# Patient Record
Sex: FEMALE | Race: WHITE | NOT HISPANIC OR LATINO | Employment: OTHER | URBAN - METROPOLITAN AREA
[De-identification: names, ages, dates, MRNs, and addresses within clinical notes are randomized per-mention and may not be internally consistent; named-entity substitution may affect disease eponyms.]

---

## 2018-01-29 ENCOUNTER — RECORDS - HEALTHEAST (OUTPATIENT)
Dept: LAB | Facility: CLINIC | Age: 83
End: 2018-01-29

## 2018-01-29 LAB
ANION GAP SERPL CALCULATED.3IONS-SCNC: 13 MMOL/L (ref 5–18)
BUN SERPL-MCNC: 27 MG/DL (ref 8–28)
CALCIUM SERPL-MCNC: 9.6 MG/DL (ref 8.5–10.5)
CHLORIDE BLD-SCNC: 105 MMOL/L (ref 98–107)
CHOLEST SERPL-MCNC: 250 MG/DL
CO2 SERPL-SCNC: 22 MMOL/L (ref 22–31)
CREAT SERPL-MCNC: 1.08 MG/DL (ref 0.6–1.1)
FASTING STATUS PATIENT QL REPORTED: ABNORMAL
GFR SERPL CREATININE-BSD FRML MDRD: 48 ML/MIN/1.73M2
GLUCOSE BLD-MCNC: 127 MG/DL (ref 70–125)
HDLC SERPL-MCNC: 59 MG/DL
LDLC SERPL CALC-MCNC: 169 MG/DL
POTASSIUM BLD-SCNC: 5.2 MMOL/L (ref 3.5–5)
SODIUM SERPL-SCNC: 140 MMOL/L (ref 136–145)
TRIGL SERPL-MCNC: 112 MG/DL
URATE SERPL-MCNC: 3.9 MG/DL (ref 2–7.5)

## 2019-01-23 ENCOUNTER — RECORDS - HEALTHEAST (OUTPATIENT)
Dept: LAB | Facility: CLINIC | Age: 84
End: 2019-01-23

## 2019-01-23 LAB
ANION GAP SERPL CALCULATED.3IONS-SCNC: 9 MMOL/L (ref 5–18)
BUN SERPL-MCNC: 28 MG/DL (ref 8–28)
CALCIUM SERPL-MCNC: 9.7 MG/DL (ref 8.5–10.5)
CHLORIDE BLD-SCNC: 105 MMOL/L (ref 98–107)
CHOLEST SERPL-MCNC: 243 MG/DL
CO2 SERPL-SCNC: 26 MMOL/L (ref 22–31)
CREAT SERPL-MCNC: 1.05 MG/DL (ref 0.6–1.1)
FASTING STATUS PATIENT QL REPORTED: ABNORMAL
GFR SERPL CREATININE-BSD FRML MDRD: 50 ML/MIN/1.73M2
GLUCOSE BLD-MCNC: 113 MG/DL (ref 70–125)
HDLC SERPL-MCNC: 55 MG/DL
LDLC SERPL CALC-MCNC: 164 MG/DL
POTASSIUM BLD-SCNC: 4.8 MMOL/L (ref 3.5–5)
SODIUM SERPL-SCNC: 140 MMOL/L (ref 136–145)
TRIGL SERPL-MCNC: 119 MG/DL
URATE SERPL-MCNC: 3.8 MG/DL (ref 2–7.5)

## 2020-08-11 ENCOUNTER — RECORDS - HEALTHEAST (OUTPATIENT)
Dept: LAB | Facility: CLINIC | Age: 85
End: 2020-08-11

## 2020-08-11 LAB
ANION GAP SERPL CALCULATED.3IONS-SCNC: 10 MMOL/L (ref 5–18)
BUN SERPL-MCNC: 26 MG/DL (ref 8–28)
CALCIUM SERPL-MCNC: 9.6 MG/DL (ref 8.5–10.5)
CHLORIDE BLD-SCNC: 106 MMOL/L (ref 98–107)
CHOLEST SERPL-MCNC: 255 MG/DL
CO2 SERPL-SCNC: 24 MMOL/L (ref 22–31)
CREAT SERPL-MCNC: 1.07 MG/DL (ref 0.6–1.1)
FASTING STATUS PATIENT QL REPORTED: ABNORMAL
GFR SERPL CREATININE-BSD FRML MDRD: 48 ML/MIN/1.73M2
GLUCOSE BLD-MCNC: 108 MG/DL (ref 70–125)
HDLC SERPL-MCNC: 52 MG/DL
LDLC SERPL CALC-MCNC: 180 MG/DL
POTASSIUM BLD-SCNC: 4.8 MMOL/L (ref 3.5–5)
SODIUM SERPL-SCNC: 140 MMOL/L (ref 136–145)
TRIGL SERPL-MCNC: 115 MG/DL

## 2021-02-01 ENCOUNTER — RECORDS - HEALTHEAST (OUTPATIENT)
Dept: LAB | Facility: CLINIC | Age: 86
End: 2021-02-01

## 2021-02-01 LAB
ANION GAP SERPL CALCULATED.3IONS-SCNC: 12 MMOL/L (ref 5–18)
BUN SERPL-MCNC: 34 MG/DL (ref 8–28)
CALCIUM SERPL-MCNC: 9.7 MG/DL (ref 8.5–10.5)
CHLORIDE BLD-SCNC: 105 MMOL/L (ref 98–107)
CO2 SERPL-SCNC: 22 MMOL/L (ref 22–31)
CREAT SERPL-MCNC: 1.23 MG/DL (ref 0.6–1.1)
GFR SERPL CREATININE-BSD FRML MDRD: 41 ML/MIN/1.73M2
GLUCOSE BLD-MCNC: 118 MG/DL (ref 70–125)
POTASSIUM BLD-SCNC: 4.5 MMOL/L (ref 3.5–5)
SODIUM SERPL-SCNC: 139 MMOL/L (ref 136–145)

## 2021-02-24 ENCOUNTER — COMMUNICATION - HEALTHEAST (OUTPATIENT)
Dept: SCHEDULING | Facility: CLINIC | Age: 86
End: 2021-02-24

## 2021-02-25 ENCOUNTER — COMMUNICATION - HEALTHEAST (OUTPATIENT)
Dept: CARDIOLOGY | Facility: CLINIC | Age: 86
End: 2021-02-25

## 2021-03-02 ENCOUNTER — RECORDS - HEALTHEAST (OUTPATIENT)
Dept: LAB | Facility: CLINIC | Age: 86
End: 2021-03-02

## 2021-03-02 LAB
ANION GAP SERPL CALCULATED.3IONS-SCNC: 13 MMOL/L (ref 5–18)
BUN SERPL-MCNC: 71 MG/DL (ref 8–28)
CALCIUM SERPL-MCNC: 9 MG/DL (ref 8.5–10.5)
CHLORIDE BLD-SCNC: 102 MMOL/L (ref 98–107)
CO2 SERPL-SCNC: 24 MMOL/L (ref 22–31)
CREAT SERPL-MCNC: 1.72 MG/DL (ref 0.6–1.1)
GFR SERPL CREATININE-BSD FRML MDRD: 28 ML/MIN/1.73M2
GLUCOSE BLD-MCNC: 103 MG/DL (ref 70–125)
POTASSIUM BLD-SCNC: 4.4 MMOL/L (ref 3.5–5)
SODIUM SERPL-SCNC: 139 MMOL/L (ref 136–145)

## 2021-03-03 ENCOUNTER — RECORDS - HEALTHEAST (OUTPATIENT)
Dept: ADMINISTRATIVE | Facility: OTHER | Age: 86
End: 2021-03-03

## 2021-03-03 ENCOUNTER — AMBULATORY - HEALTHEAST (OUTPATIENT)
Dept: CARDIOLOGY | Facility: CLINIC | Age: 86
End: 2021-03-03

## 2021-03-04 ENCOUNTER — OFFICE VISIT - HEALTHEAST (OUTPATIENT)
Dept: CARDIOLOGY | Facility: CLINIC | Age: 86
End: 2021-03-04

## 2021-03-04 ENCOUNTER — AMBULATORY - HEALTHEAST (OUTPATIENT)
Dept: CARDIOLOGY | Facility: CLINIC | Age: 86
End: 2021-03-04

## 2021-03-04 DIAGNOSIS — I35.0 SEVERE AORTIC STENOSIS: ICD-10-CM

## 2021-03-04 DIAGNOSIS — I35.0 NONRHEUMATIC AORTIC VALVE STENOSIS: ICD-10-CM

## 2021-03-04 DIAGNOSIS — I48.91 ATRIAL FIBRILLATION, NEW ONSET (H): ICD-10-CM

## 2021-03-12 ENCOUNTER — AMBULATORY - HEALTHEAST (OUTPATIENT)
Dept: CARDIOLOGY | Facility: HOSPITAL | Age: 86
End: 2021-03-12

## 2021-03-12 ENCOUNTER — AMBULATORY - HEALTHEAST (OUTPATIENT)
Dept: CARDIOLOGY | Facility: CLINIC | Age: 86
End: 2021-03-12

## 2021-03-12 DIAGNOSIS — Z11.59 ENCOUNTER FOR SCREENING FOR OTHER VIRAL DISEASES: ICD-10-CM

## 2021-03-12 DIAGNOSIS — I48.91 ATRIAL FIBRILLATION, NEW ONSET (H): ICD-10-CM

## 2021-03-12 LAB
ANION GAP SERPL CALCULATED.3IONS-SCNC: 10 MMOL/L (ref 5–18)
BUN SERPL-MCNC: 44 MG/DL (ref 8–28)
CALCIUM SERPL-MCNC: 9.4 MG/DL (ref 8.5–10.5)
CHLORIDE BLD-SCNC: 106 MMOL/L (ref 98–107)
CO2 SERPL-SCNC: 25 MMOL/L (ref 22–31)
CREAT SERPL-MCNC: 1.27 MG/DL (ref 0.6–1.1)
GFR SERPL CREATININE-BSD FRML MDRD: 40 ML/MIN/1.73M2
GLUCOSE BLD-MCNC: 132 MG/DL (ref 70–125)
POTASSIUM BLD-SCNC: 4.6 MMOL/L (ref 3.5–5)
SODIUM SERPL-SCNC: 141 MMOL/L (ref 136–145)

## 2021-03-14 ENCOUNTER — COMMUNICATION - HEALTHEAST (OUTPATIENT)
Dept: SCHEDULING | Facility: CLINIC | Age: 86
End: 2021-03-14

## 2021-03-15 ENCOUNTER — COMMUNICATION - HEALTHEAST (OUTPATIENT)
Dept: SCHEDULING | Facility: CLINIC | Age: 86
End: 2021-03-15

## 2021-03-15 ENCOUNTER — COMMUNICATION - HEALTHEAST (OUTPATIENT)
Dept: CARDIOLOGY | Facility: HOSPITAL | Age: 86
End: 2021-03-15

## 2021-03-16 ENCOUNTER — SURGERY - HEALTHEAST (OUTPATIENT)
Dept: CARDIOLOGY | Facility: CLINIC | Age: 86
End: 2021-03-16

## 2021-03-16 DIAGNOSIS — I35.0 SEVERE AORTIC VALVE STENOSIS: ICD-10-CM

## 2021-03-18 ENCOUNTER — COMMUNICATION - HEALTHEAST (OUTPATIENT)
Dept: CARDIOLOGY | Facility: CLINIC | Age: 86
End: 2021-03-18

## 2021-03-19 ENCOUNTER — HOSPITAL ENCOUNTER (OUTPATIENT)
Dept: CT IMAGING | Facility: CLINIC | Age: 86
Discharge: HOME | End: 2021-03-19
Attending: INTERNAL MEDICINE

## 2021-03-19 ENCOUNTER — INFUSION - HEALTHEAST (OUTPATIENT)
Dept: INFUSION THERAPY | Facility: CLINIC | Age: 86
End: 2021-03-19

## 2021-03-19 DIAGNOSIS — I35.0 SEVERE AORTIC STENOSIS: ICD-10-CM

## 2021-03-19 DIAGNOSIS — I35.0 SEVERE AORTIC VALVE STENOSIS: ICD-10-CM

## 2021-03-19 DIAGNOSIS — Z11.59 ENCOUNTER FOR SCREENING FOR OTHER VIRAL DISEASES: ICD-10-CM

## 2021-03-21 ENCOUNTER — COMMUNICATION - HEALTHEAST (OUTPATIENT)
Dept: CARDIOLOGY | Facility: HOSPITAL | Age: 86
End: 2021-03-21

## 2021-03-23 ENCOUNTER — SURGERY - HEALTHEAST (OUTPATIENT)
Dept: CARDIOLOGY | Facility: CLINIC | Age: 86
End: 2021-03-23

## 2021-03-23 ENCOUNTER — COMMUNICATION - HEALTHEAST (OUTPATIENT)
Dept: CARDIOLOGY | Facility: CLINIC | Age: 86
End: 2021-03-23

## 2021-03-23 DIAGNOSIS — I35.0 SEVERE AORTIC VALVE STENOSIS: ICD-10-CM

## 2021-03-25 ENCOUNTER — COMMUNICATION - HEALTHEAST (OUTPATIENT)
Dept: CARDIOLOGY | Facility: HOSPITAL | Age: 86
End: 2021-03-25

## 2021-03-27 LAB
SARS-COV-2 PCR COMMENT: NORMAL
SARS-COV-2 RNA SPEC QL NAA+PROBE: NEGATIVE
SARS-COV-2 VIRUS SPECIMEN SOURCE: NORMAL

## 2021-03-28 ENCOUNTER — COMMUNICATION - HEALTHEAST (OUTPATIENT)
Dept: SCHEDULING | Facility: CLINIC | Age: 86
End: 2021-03-28

## 2021-03-29 ENCOUNTER — SURGERY - HEALTHEAST (OUTPATIENT)
Dept: CARDIOLOGY | Facility: HOSPITAL | Age: 86
End: 2021-03-29

## 2021-03-29 ASSESSMENT — MIFFLIN-ST. JEOR: SCORE: 1285.44

## 2021-04-02 ENCOUNTER — OFFICE VISIT - HEALTHEAST (OUTPATIENT)
Dept: CARDIOLOGY | Facility: CLINIC | Age: 86
End: 2021-04-02

## 2021-04-02 DIAGNOSIS — I35.0 SEVERE AORTIC VALVE STENOSIS: ICD-10-CM

## 2021-04-02 DIAGNOSIS — I50.32 CHRONIC HEART FAILURE WITH PRESERVED EJECTION FRACTION (H): ICD-10-CM

## 2021-04-02 DIAGNOSIS — I10 ESSENTIAL HYPERTENSION: ICD-10-CM

## 2021-04-02 DIAGNOSIS — I48.19 PERSISTENT ATRIAL FIBRILLATION (H): ICD-10-CM

## 2021-04-05 ENCOUNTER — COMMUNICATION - HEALTHEAST (OUTPATIENT)
Dept: CARDIOLOGY | Facility: CLINIC | Age: 86
End: 2021-04-05

## 2021-04-05 ENCOUNTER — OFFICE VISIT - HEALTHEAST (OUTPATIENT)
Dept: CARDIOLOGY | Facility: CLINIC | Age: 86
End: 2021-04-05

## 2021-04-05 DIAGNOSIS — I35.0 NONRHEUMATIC AORTIC VALVE STENOSIS: ICD-10-CM

## 2021-04-07 ENCOUNTER — COMMUNICATION - HEALTHEAST (OUTPATIENT)
Dept: CARDIOLOGY | Facility: CLINIC | Age: 86
End: 2021-04-07

## 2021-04-09 ENCOUNTER — COMMUNICATION - HEALTHEAST (OUTPATIENT)
Dept: CARDIOLOGY | Facility: CLINIC | Age: 86
End: 2021-04-09

## 2021-04-09 DIAGNOSIS — I50.30 (HFPEF) HEART FAILURE WITH PRESERVED EJECTION FRACTION (H): ICD-10-CM

## 2021-04-09 DIAGNOSIS — I35.0 SEVERE AORTIC STENOSIS: ICD-10-CM

## 2021-04-12 ENCOUNTER — COMMUNICATION - HEALTHEAST (OUTPATIENT)
Dept: CARDIOLOGY | Facility: CLINIC | Age: 86
End: 2021-04-12

## 2021-04-14 DIAGNOSIS — I35.0 AORTIC STENOSIS: Primary | ICD-10-CM

## 2021-04-14 DIAGNOSIS — Z11.59 ENCOUNTER FOR SCREENING FOR OTHER VIRAL DISEASES: ICD-10-CM

## 2021-04-16 ENCOUNTER — AMBULATORY - HEALTHEAST (OUTPATIENT)
Dept: FAMILY MEDICINE | Facility: CLINIC | Age: 86
End: 2021-04-16

## 2021-04-16 ENCOUNTER — COMMUNICATION - HEALTHEAST (OUTPATIENT)
Dept: CARDIOLOGY | Facility: CLINIC | Age: 86
End: 2021-04-16

## 2021-04-16 DIAGNOSIS — Z11.59 ENCOUNTER FOR SCREENING FOR OTHER VIRAL DISEASES: ICD-10-CM

## 2021-04-17 ENCOUNTER — AMBULATORY - HEALTHEAST (OUTPATIENT)
Dept: FAMILY MEDICINE | Facility: CLINIC | Age: 86
End: 2021-04-17

## 2021-04-17 DIAGNOSIS — Z11.59 ENCOUNTER FOR SCREENING FOR OTHER VIRAL DISEASES: ICD-10-CM

## 2021-04-19 ENCOUNTER — AMBULATORY - HEALTHEAST (OUTPATIENT)
Dept: CARDIOLOGY | Facility: CLINIC | Age: 86
End: 2021-04-19

## 2021-04-19 ENCOUNTER — ANESTHESIA EVENT (OUTPATIENT)
Dept: CARDIOLOGY | Facility: CLINIC | Age: 86
DRG: 267 | End: 2021-04-19
Payer: MEDICARE

## 2021-04-19 ENCOUNTER — COMMUNICATION - HEALTHEAST (OUTPATIENT)
Dept: CARDIOLOGY | Facility: CLINIC | Age: 86
End: 2021-04-19

## 2021-04-19 ENCOUNTER — COMMUNICATION - HEALTHEAST (OUTPATIENT)
Dept: SCHEDULING | Facility: CLINIC | Age: 86
End: 2021-04-19

## 2021-04-19 ENCOUNTER — OFFICE VISIT - HEALTHEAST (OUTPATIENT)
Dept: CARDIOLOGY | Facility: CLINIC | Age: 86
End: 2021-04-19

## 2021-04-19 DIAGNOSIS — I35.0 SEVERE AORTIC VALVE STENOSIS: ICD-10-CM

## 2021-04-19 DIAGNOSIS — I50.30 (HFPEF) HEART FAILURE WITH PRESERVED EJECTION FRACTION (H): ICD-10-CM

## 2021-04-19 DIAGNOSIS — I35.0 SEVERE AORTIC STENOSIS: ICD-10-CM

## 2021-04-19 DIAGNOSIS — I10 ESSENTIAL HYPERTENSION: ICD-10-CM

## 2021-04-19 DIAGNOSIS — I35.0 AORTIC STENOSIS: ICD-10-CM

## 2021-04-19 DIAGNOSIS — I50.32 CHRONIC DIASTOLIC CONGESTIVE HEART FAILURE (H): ICD-10-CM

## 2021-04-19 DIAGNOSIS — I48.19 PERSISTENT ATRIAL FIBRILLATION (H): ICD-10-CM

## 2021-04-19 DIAGNOSIS — I35.0 NONRHEUMATIC AORTIC VALVE STENOSIS: Primary | ICD-10-CM

## 2021-04-19 DIAGNOSIS — I48.91 ATRIAL FIBRILLATION, NEW ONSET (H): ICD-10-CM

## 2021-04-19 DIAGNOSIS — I50.32 CHRONIC HEART FAILURE WITH PRESERVED EJECTION FRACTION (H): ICD-10-CM

## 2021-04-19 LAB
ALBUMIN SERPL-MCNC: 3.9 G/DL (ref 3.5–5)
ALP SERPL-CCNC: 67 U/L (ref 45–120)
ALT SERPL W P-5'-P-CCNC: 18 U/L (ref 0–45)
ANION GAP SERPL CALCULATED.3IONS-SCNC: 8 MMOL/L (ref 5–18)
AST SERPL W P-5'-P-CCNC: 18 U/L (ref 0–40)
ATRIAL RATE - MUSE: NORMAL
BASOPHILS # BLD AUTO: 0.1 THOU/UL (ref 0–0.2)
BASOPHILS NFR BLD AUTO: 1 % (ref 0–2)
BILIRUB SERPL-MCNC: 0.9 MG/DL (ref 0–1)
BNP SERPL-MCNC: 686 PG/ML (ref 0–167)
BUN SERPL-MCNC: 48 MG/DL (ref 8–28)
CALCIUM SERPL-MCNC: 9.7 MG/DL (ref 8.5–10.5)
CHLORIDE BLD-SCNC: 99 MMOL/L (ref 98–107)
CO2 SERPL-SCNC: 32 MMOL/L (ref 22–31)
CREAT SERPL-MCNC: 1.48 MG/DL (ref 0.6–1.1)
DIASTOLIC BLOOD PRESSURE - MUSE: NORMAL
EOSINOPHIL # BLD AUTO: 0.2 THOU/UL (ref 0–0.4)
EOSINOPHIL NFR BLD AUTO: 3 % (ref 0–6)
ERYTHROCYTE [DISTWIDTH] IN BLOOD BY AUTOMATED COUNT: 14.8 % (ref 11–14.5)
GFR SERPL CREATININE-BSD FRML MDRD: 33 ML/MIN/1.73M2
GLUCOSE BLD-MCNC: 139 MG/DL (ref 70–125)
HCT VFR BLD AUTO: 36.9 % (ref 35–47)
HGB BLD-MCNC: 11.2 G/DL (ref 12–16)
IMM GRANULOCYTES # BLD: 0.1 THOU/UL
IMM GRANULOCYTES NFR BLD: 1 %
INR PPP: 1.06 (ref 0.9–1.1)
INTERPRETATION ECG - MUSE: NORMAL
LYMPHOCYTES # BLD AUTO: 1.1 THOU/UL (ref 0.8–4.4)
LYMPHOCYTES NFR BLD AUTO: 13 % (ref 20–40)
MAGNESIUM SERPL-MCNC: 2.1 MG/DL (ref 1.8–2.6)
MCH RBC QN AUTO: 28.9 PG (ref 27–34)
MCHC RBC AUTO-ENTMCNC: 30.4 G/DL (ref 32–36)
MCV RBC AUTO: 95 FL (ref 80–100)
MONOCYTES # BLD AUTO: 0.5 THOU/UL (ref 0–0.9)
MONOCYTES NFR BLD AUTO: 6 % (ref 2–10)
NEUTROPHILS # BLD AUTO: 6.8 THOU/UL (ref 2–7.7)
NEUTROPHILS NFR BLD AUTO: 77 % (ref 50–70)
P AXIS - MUSE: NORMAL
PLATELET # BLD AUTO: 160 THOU/UL (ref 140–440)
PMV BLD AUTO: 11.9 FL (ref 8.5–12.5)
POTASSIUM BLD-SCNC: 4.4 MMOL/L (ref 3.5–5)
PR INTERVAL - MUSE: NORMAL
PROT SERPL-MCNC: 7.3 G/DL (ref 6–8)
QRS DURATION - MUSE: 88 MS
QT - MUSE: 442 MS
QTC - MUSE: 473 MS
R AXIS - MUSE: -12 DEGREES
RBC # BLD AUTO: 3.88 MILL/UL (ref 3.8–5.4)
SODIUM SERPL-SCNC: 139 MMOL/L (ref 136–145)
SPECIMEN STATUS - HISTORICAL: NORMAL
SYSTOLIC BLOOD PRESSURE - MUSE: NORMAL
T AXIS - MUSE: 128 DEGREES
VENTRICULAR RATE- MUSE: 69 BPM
WBC: 8.8 THOU/UL (ref 4–11)

## 2021-04-19 PROCEDURE — 86900 BLOOD TYPING SEROLOGIC ABO: CPT | Performed by: INTERNAL MEDICINE

## 2021-04-19 PROCEDURE — 86901 BLOOD TYPING SEROLOGIC RH(D): CPT | Performed by: INTERNAL MEDICINE

## 2021-04-19 PROCEDURE — 86923 COMPATIBILITY TEST ELECTRIC: CPT | Performed by: INTERNAL MEDICINE

## 2021-04-19 PROCEDURE — 86850 RBC ANTIBODY SCREEN: CPT | Performed by: INTERNAL MEDICINE

## 2021-04-19 RX ORDER — ASPIRIN 325 MG
325 TABLET ORAL DAILY
Status: CANCELLED | OUTPATIENT
Start: 2021-04-19

## 2021-04-19 RX ORDER — CEFAZOLIN SODIUM 2 G/50ML
2 SOLUTION INTRAVENOUS
Status: CANCELLED | OUTPATIENT
Start: 2021-04-19

## 2021-04-19 RX ORDER — LIDOCAINE 40 MG/G
CREAM TOPICAL
Status: CANCELLED | OUTPATIENT
Start: 2021-04-19

## 2021-04-19 RX ORDER — SODIUM CHLORIDE 9 MG/ML
INJECTION, SOLUTION INTRAVENOUS CONTINUOUS
Status: CANCELLED | OUTPATIENT
Start: 2021-04-19

## 2021-04-19 ASSESSMENT — MIFFLIN-ST. JEOR: SCORE: 1219.21

## 2021-04-19 NOTE — ANESTHESIA PREPROCEDURE EVALUATION
Anesthesia Pre-Procedure Evaluation    Patient: Radha Orona   MRN: [id number] : 9/10/1931        Preoperative Diagnosis: valvular disease   Procedure : Procedure(s):  CV TRANSCATHETER AORTIC VALVE REPLACEMENT  OR TRANSCATHETER AORTIC VALVE REPLACEMENT, OPEN FEMORAL ARTERY APPROACH     No past medical history on file.   No past surgical history on file.   Not on File   Social History     Tobacco Use     Smoking status: Not on file   Substance Use Topics     Alcohol use: Not on file      Wt Readings from Last 1 Encounters:   No data found for Wt        Anesthesia Evaluation   Pt has had prior anesthetic.     No history of anesthetic complications       ROS/MED HX  ENT/Pulmonary:  - neg pulmonary ROS     Neurologic:  - neg neurologic ROS     Cardiovascular:     (+) -----Taking blood thinners Pt has received instructions: dysrhythmias, a-fib, Previous cardiac testing   Echo: Date: 2021 Results:  1. Normal left ventricular size and systolic performance with a visually   estimated ejection fraction of 55%.   2. There is severe aortic stenosis.     The mean gradient is measured at 38-41 mmHg with peak velocity of 4.3   m/s.  Calculated valve area 0.6 cm . VRVTI = 0.2. VRvel = 0.2.   3. There is trace aortic insufficiency.   4. There is mild-moderate, to perhaps moderate, mitral insufficiency.  5. Normal right ventricular size and systolic performance.   6. There is moderate left atrial enlargement. There is mild right atrial   enlargement.   Stress Test: Date: Results:    ECG Reviewed: Date: Results:    Cath: Date: 3/29/21 Results:  Radha Orona is a 89 y.o. old female with severe aortic stenosis, moderate   MR, HTN, newer onset A fib (on Eliquis and Metoprolol), HL, CKD, gout who   is here for pre-TAVR angiography.      - non-obstructive CAD  - proceed w/ TAVR w/u as planned  - re-start apixaban  - continue aggressive risk factor modification    METS/Exercise Tolerance:     Hematologic:       Musculoskeletal:        GI/Hepatic:  - neg GI/hepatic ROS     Renal/Genitourinary:       Endo:       Psychiatric/Substance Use:       Infectious Disease:       Malignancy:       Other:            Physical Exam    Airway        Mallampati: II   TM distance: > 3 FB   Neck ROM: full   Mouth opening: > 3 cm    Respiratory Devices and Support         Dental  no notable dental history         Cardiovascular   cardiovascular exam normal       Rhythm and rate: irregular and normal     Pulmonary   pulmonary exam normal                OUTSIDE LABS:  CBC: No results found for: WBC, HGB, HCT, PLT  BMP: No results found for: NA, POTASSIUM, CHLORIDE, CO2, BUN, CR, GLC  COAGS: No results found for: PTT, INR, FIBR  POC: No results found for: BGM, HCG, HCGS  HEPATIC: No results found for: ALBUMIN, PROTTOTAL, ALT, AST, GGT, ALKPHOS, BILITOTAL, BILIDIRECT, PHANI  OTHER: No results found for: PH, LACT, A1C, SAM, PHOS, MAG, LIPASE, AMYLASE, TSH, T4, T3, CRP, SED    Anesthesia Plan    ASA Status:  4      Anesthesia Type: MAC.     - Reason for MAC: immobility needed         Techniques and Equipment:     - Lines/Monitors: 2nd IV     Consents    Anesthesia Plan(s) and associated risks, benefits, and realistic alternatives discussed. Questions answered and patient/representative(s) expressed understanding.     - Discussed with:  Patient      - Extended Intubation/Ventilatory Support Discussed: No.      - Patient is DNR/DNI Status: No    Use of blood products discussed: Yes.     - Discussed with: Patient.     - Consented: consented to blood products     Postoperative Care    Pain management: IV analgesics.   PONV prophylaxis: Ondansetron (or other 5HT-3)     Comments:                Adam Stevens MD

## 2021-04-19 NOTE — H&P
[facility]  Radha Orona MRN: [id number]  9/10/1931  Date of Admission:(Not on file)  Primary care provider: Chevy Funk         Chief Complaint:   Dyspnea          History of Present Illness:   Radha Orona is an 89 year old female with a past medical history significant for HTN, paroxysmal atrial fibrillation, HLD, CKD 3, gout, moderate MR, and severe symptomatic aortic stenosis. Patient was recently seen in clinic at [facility] for progressive functional decline 2/2 dyspnea with exertion that she first noted ~1 year ago. Per notation, she was recently hospitalized in February for new afib as well as a heart failure exaceration. TTE demonstrated normal function as well as severe AS. Patient ultimately referred for TAVR with the [facility] group.     Patient visited in the PACU. She is awake, alert, and feels well. She is pleased by how quickly the procedure was completed. Denies dyspnea, chest pain, headache, vision change, fever, chills, numbness, weakness, or abdominal pain. Denies any extremity pain.            Review of Systems:    10 point review of systems negative except for stated above in HPI.          Past Medical History:   Medical History reviewed, discussed above.  No past medical history on file.          Past Surgical History:   Surgical History reviewed.   History reviewed. No pertinent surgical history.          Social History:   Social History reviewed.  Social History     Tobacco Use     Smoking status: Not on file   Substance Use Topics     Alcohol use: Not on file             Family History:   Family History reviewed.   History reviewed. No pertinent family history.          Allergies:     Allergies   Allergen Reactions     Celebrex [Celecoxib] Unknown     Other reaction(s): *Unknown  hives       Other Environmental Allergy      Sulfa Drugs      Other reaction(s): *Unknown  rash               Medications:   Medications Reviewed.   Current  "Facility-Administered Medications   Medication     aspirin (ASA) tablet 325 mg     fentaNYL (PF) (SUBLIMAZE) injection 25-50 mcg     HOLD: apixaban (ELIQUIS) 48 hours pre-procedure     HYDROmorphone (PF) (DILAUDID) injection 0.3-0.5 mg     lactated ringers infusion     lactated ringers infusion     lidocaine (LMX4) cream     lidocaine 1 % 0.1-1 mL     lidocaine 1 %     ondansetron (ZOFRAN-ODT) ODT tab 4 mg    Or     ondansetron (ZOFRAN) injection 4 mg     Patient is NOT allergic to contrast dye OR was given pre-procedure oral steroids for treatment     prochlorperazine (COMPAZINE) injection 5 mg     sodium chloride (PF) 0.9% PF flush 3 mL     sodium chloride (PF) 0.9% PF flush 3 mL     sodium chloride (PF) 0.9% PF flush 3 mL     sodium chloride 0.9% infusion     sodium chloride 0.9% infusion             Physical Exam:   Vitals were reviewed.  Blood pressure 116/80, pulse 75, temperature 97.5  F (36.4  C), temperature source Oral, resp. rate 20, height 1.651 m (5' 5\"), weight 86 kg (189 lb 9.5 oz), SpO2 98 %.    General: AAOx3, NAD  Skin: Not jaundiced, no rash, no ecchymoses  HEENT: MMM, PERRLA, EOM intact  CV: RRR, normal S1S2, no murmur, clicks, rubs  Resp: Limited due to bed rest, but seemingly clear to auscultation bilaterally, no wheezes, rhonchi  Abd: Soft, non-tender, BS+, no masses appreciated  Extremities: warm and well perfused. Pedal and post tib pulses are not palpable, but can be detected via doppler  Neuro: No lateralizing symptoms or focal neurologic deficits        Labs:   Routine Labs:  No results found for: TROPI, TROPONIN, TROPR, TROPN  CMP  Recent Labs   Lab 04/20/21  1254 04/20/21  0841     --    POTASSIUM 3.5 3.7   CHLORIDE 101  --    CO2 28  --    ANIONGAP 8  --    *  --    BUN 47*  --    CR 1.38* 1.44*   GFRESTIMATED 34* 32*   GFRESTBLACK 39* 37*   SAM 9.1  --    MAG 2.3  --    PROTTOTAL 6.6*  --    ALBUMIN 3.3*  --    BILITOTAL 0.5  --    ALKPHOS 61  --    AST 20  --    ALT 21  " --      CBC  Recent Labs   Lab 04/20/21  1254 04/20/21  0841   WBC 8.3  --    RBC 3.51*  --    HGB 10.1* 11.3*   HCT 33.9*  --    MCV 97  --    MCH 28.8  --    MCHC 29.8*  --    RDW 14.8  --    *  --      INR  Recent Labs   Lab 04/20/21  0841   INR 1.12           Diagnostics:      Baseline EKG 4/20/2021   Afib, LAD, rate controlled      Assessment and Plan:     Radha Orona is an 89 year old female with a past medical history significant for HTN, paroxysmal atrial fibrillation, HLD, CKD 3, gout, moderate MR, and severe symptomatic aortic stenosis.     # Severe aortic stenosis  ##Moderate MR  ##Transient complete heart block while on table  now s/p 26mm ES3 Transcatheter Aortic Valve Replacement. Prior to procedure, pt noted to have an CRISTHIAN 0.6 cm^2- and DI of 0.2. Sheath sites soft without hematoma. Noted to have short period of CHB then suspected junctional escape rhythm during procedure. Some concern for left femoral artery compression post-procedure. Post tib and pedal pulses are doplperable, but faint. Per report, it may be similar to pre-op.   - Bedrest per protocol.    - Neuro checks, per protocol.  - Echocardiogram tomorrow.   - Monitor groin sites.   - EKG in morning.   - Werner can be removed once patient is out of bed.   - Cardiac rehab/PT/OT.  - aspirin 81mg daily  - Diurese as needed/able.   - Daily weights.   - Strict intake/output.       #Parosyxmal atrial fibrillation   Recently diagnosed, and on metoprolol tartrate and apixaban at baseline.   - hold metoprolol pending rhythm stability on tele  - resume apixaban 5mg bid    #HTN  #HLD  #Non obstructive CAD on angiogram 3/2021  - aspirin as above  - resume losartan 25mg in AM    FEN: Cardiac diet  Prophylaxis:  DVT: Cont pta apixiban  Disposition: Admission to I.   Code Status: FULL CODE      Patient discussed with Dr. Mathur, who agrees with the above plan.     Omar Will PA-C  [facility] Cardiology Team

## 2021-04-19 NOTE — PROGRESS NOTES
Patient in to see RN for Pre-TAVR visit on 4/19/2021     All pre-procedure labs drawn: 4/19/2021    EKG obtained: 4/19/2021      MRSA nose swab collected: 4/19/2021     5 meter walk: 4/19/2021   9.62, 9.85, 10.30      STS score: 5%  NYHA score: 3  CCS score: 0    Labs reviewed: yes, no actions needed    Patient instructed on medications:   Continue to hold Eliquis the morning of your procedure  Hold all other meds that morning    Patient on blood thinner: Eliquis    Loading dose of ASA: 325 MG upon arrival to [facility]    Sent home with showering instructions.        Education was given to patient regarding what to expect pre-procedure.     Instructed to come to the main entrance of [facility] at 8 am    TAVR and [place] consent signed at the time of the appt: yes, sent to 3C    All questions were answered to family and patient by RN.    Patient and daughter present at the time of appointment.      Noemi Dye RN, BSN  Valve Clinic Coordinator  [facility]  [phone]  04/19/21 11:37 AM

## 2021-04-20 ENCOUNTER — HOSPITAL ENCOUNTER (OUTPATIENT)
Dept: CARDIOLOGY | Facility: CLINIC | Age: 86
DRG: 267 | End: 2021-04-20
Attending: INTERNAL MEDICINE | Admitting: INTERNAL MEDICINE
Payer: MEDICARE

## 2021-04-20 ENCOUNTER — HOSPITAL ENCOUNTER (INPATIENT)
Facility: CLINIC | Age: 86
LOS: 1 days | Discharge: HOME | DRG: 267 | End: 2021-04-21
Attending: INTERNAL MEDICINE | Admitting: INTERNAL MEDICINE
Payer: MEDICARE

## 2021-04-20 ENCOUNTER — ANESTHESIA (OUTPATIENT)
Dept: CARDIOLOGY | Facility: CLINIC | Age: 86
DRG: 267 | End: 2021-04-20
Payer: MEDICARE

## 2021-04-20 DIAGNOSIS — I35.0 AORTIC STENOSIS: ICD-10-CM

## 2021-04-20 DIAGNOSIS — I35.0 NONRHEUMATIC AORTIC VALVE STENOSIS: ICD-10-CM

## 2021-04-20 DIAGNOSIS — Z95.2 S/P TAVR (TRANSCATHETER AORTIC VALVE REPLACEMENT): Primary | ICD-10-CM

## 2021-04-20 DIAGNOSIS — I48.0 PAROXYSMAL ATRIAL FIBRILLATION (H): ICD-10-CM

## 2021-04-20 LAB
ABO + RH BLD: NORMAL
ABO + RH BLD: NORMAL
ALBUMIN SERPL-MCNC: 3.3 G/DL (ref 3.4–5)
ALP SERPL-CCNC: 61 U/L (ref 40–150)
ALT SERPL W P-5'-P-CCNC: 21 U/L (ref 0–50)
ANION GAP SERPL CALCULATED.3IONS-SCNC: 8 MMOL/L (ref 3–14)
AST SERPL W P-5'-P-CCNC: 20 U/L (ref 0–45)
BACTERIA SPEC CULT: NORMAL
BILIRUB SERPL-MCNC: 0.5 MG/DL (ref 0.2–1.3)
BLD GP AB SCN SERPL QL: NORMAL
BLD PROD TYP BPU: NORMAL
BLD UNIT ID BPU: 0
BLD UNIT ID BPU: 0
BLOOD BANK CMNT PATIENT-IMP: NORMAL
BLOOD PRODUCT CODE: NORMAL
BLOOD PRODUCT CODE: NORMAL
BPU ID: NORMAL
BPU ID: NORMAL
BUN SERPL-MCNC: 47 MG/DL (ref 7–30)
CALCIUM SERPL-MCNC: 9.1 MG/DL (ref 8.5–10.1)
CHLORIDE SERPL-SCNC: 101 MMOL/L (ref 94–109)
CO2 SERPL-SCNC: 28 MMOL/L (ref 20–32)
CREAT SERPL-MCNC: 1.38 MG/DL (ref 0.52–1.04)
CREAT SERPL-MCNC: 1.44 MG/DL (ref 0.52–1.04)
ERYTHROCYTE [DISTWIDTH] IN BLOOD BY AUTOMATED COUNT: 14.8 % (ref 10–15)
GFR SERPL CREATININE-BSD FRML MDRD: 32 ML/MIN/{1.73_M2}
GFR SERPL CREATININE-BSD FRML MDRD: 34 ML/MIN/{1.73_M2}
GLUCOSE BLDC GLUCOMTR-MCNC: 106 MG/DL (ref 70–99)
GLUCOSE SERPL-MCNC: 118 MG/DL (ref 70–99)
HCT VFR BLD AUTO: 33.9 % (ref 35–47)
HGB BLD-MCNC: 10.1 G/DL (ref 11.7–15.7)
HGB BLD-MCNC: 11.3 G/DL (ref 11.7–15.7)
INR PPP: 1.12 (ref 0.86–1.14)
KCT BLD-ACNC: 196 SEC (ref 75–150)
KCT BLD-ACNC: 237 SEC (ref 75–150)
MAGNESIUM SERPL-MCNC: 2.3 MG/DL (ref 1.6–2.3)
MCH RBC QN AUTO: 28.8 PG (ref 26.5–33)
MCHC RBC AUTO-ENTMCNC: 29.8 G/DL (ref 31.5–36.5)
MCV RBC AUTO: 97 FL (ref 78–100)
NUM BPU REQUESTED: 2
PLATELET # BLD AUTO: 132 10E9/L (ref 150–450)
POTASSIUM SERPL-SCNC: 3.5 MMOL/L (ref 3.4–5.3)
POTASSIUM SERPL-SCNC: 3.7 MMOL/L (ref 3.4–5.3)
PROT SERPL-MCNC: 6.6 G/DL (ref 6.8–8.8)
RBC # BLD AUTO: 3.51 10E12/L (ref 3.8–5.2)
SODIUM SERPL-SCNC: 137 MMOL/L (ref 133–144)
SPECIMEN EXP DATE BLD: NORMAL
TRANSFUSION STATUS PATIENT QL: NORMAL
WBC # BLD AUTO: 8.3 10E9/L (ref 4–11)

## 2021-04-20 PROCEDURE — 80053 COMPREHEN METABOLIC PANEL: CPT | Performed by: INTERNAL MEDICINE

## 2021-04-20 PROCEDURE — 999N001017 HC STATISTIC GLUCOSE BY METER IP

## 2021-04-20 PROCEDURE — 85027 COMPLETE CBC AUTOMATED: CPT | Performed by: INTERNAL MEDICINE

## 2021-04-20 PROCEDURE — 250N000011 HC RX IP 250 OP 636: Performed by: INTERNAL MEDICINE

## 2021-04-20 PROCEDURE — 99223 1ST HOSP IP/OBS HIGH 75: CPT | Mod: 25 | Performed by: PHYSICIAN ASSISTANT

## 2021-04-20 PROCEDURE — 93306 TTE W/DOPPLER COMPLETE: CPT

## 2021-04-20 PROCEDURE — 272N000002 HC OR SUPPLY OTHER OPNP: Performed by: INTERNAL MEDICINE

## 2021-04-20 PROCEDURE — 360N000079 HC SURGERY LEVEL 6, PER MIN: Performed by: INTERNAL MEDICINE

## 2021-04-20 PROCEDURE — 93010 ELECTROCARDIOGRAM REPORT: CPT | Mod: 76 | Performed by: INTERNAL MEDICINE

## 2021-04-20 PROCEDURE — 33361 REPLACE AORTIC VALVE PERQ: CPT | Performed by: INTERNAL MEDICINE

## 2021-04-20 PROCEDURE — 85018 HEMOGLOBIN: CPT | Performed by: ANESTHESIOLOGY

## 2021-04-20 PROCEDURE — 258N000003 HC RX IP 258 OP 636: Performed by: INTERNAL MEDICINE

## 2021-04-20 PROCEDURE — 36415 COLL VENOUS BLD VENIPUNCTURE: CPT | Performed by: ANESTHESIOLOGY

## 2021-04-20 PROCEDURE — 85610 PROTHROMBIN TIME: CPT | Performed by: ANESTHESIOLOGY

## 2021-04-20 PROCEDURE — C1769 GUIDE WIRE: HCPCS | Performed by: INTERNAL MEDICINE

## 2021-04-20 PROCEDURE — 02RF38Z REPLACEMENT OF AORTIC VALVE WITH ZOOPLASTIC TISSUE, PERCUTANEOUS APPROACH: ICD-10-PCS | Performed by: INTERNAL MEDICINE

## 2021-04-20 PROCEDURE — 250N000013 HC RX MED GY IP 250 OP 250 PS 637: Performed by: INTERNAL MEDICINE

## 2021-04-20 PROCEDURE — 83735 ASSAY OF MAGNESIUM: CPT | Performed by: INTERNAL MEDICINE

## 2021-04-20 PROCEDURE — 370N000017 HC ANESTHESIA TECHNICAL FEE, PER MIN: Performed by: INTERNAL MEDICINE

## 2021-04-20 PROCEDURE — 250N000009 HC RX 250: Performed by: INTERNAL MEDICINE

## 2021-04-20 PROCEDURE — 214N000001 HC R&B CCU UMMC

## 2021-04-20 PROCEDURE — 410N000003 HC PER-PERFUSION 1ST 30 MIN: Performed by: INTERNAL MEDICINE

## 2021-04-20 PROCEDURE — 93355 ECHO TRANSESOPHAGEAL (TEE): CPT | Performed by: INTERNAL MEDICINE

## 2021-04-20 PROCEDURE — 85347 COAGULATION TIME ACTIVATED: CPT

## 2021-04-20 PROCEDURE — 272N000001 HC OR GENERAL SUPPLY STERILE: Performed by: INTERNAL MEDICINE

## 2021-04-20 PROCEDURE — 84132 ASSAY OF SERUM POTASSIUM: CPT | Performed by: ANESTHESIOLOGY

## 2021-04-20 PROCEDURE — 250N000011 HC RX IP 250 OP 636: Performed by: NURSE ANESTHETIST, CERTIFIED REGISTERED

## 2021-04-20 PROCEDURE — 258N000003 HC RX IP 258 OP 636: Performed by: NURSE ANESTHETIST, CERTIFIED REGISTERED

## 2021-04-20 PROCEDURE — 93005 ELECTROCARDIOGRAM TRACING: CPT

## 2021-04-20 PROCEDURE — 36415 COLL VENOUS BLD VENIPUNCTURE: CPT | Performed by: INTERNAL MEDICINE

## 2021-04-20 PROCEDURE — 999N000054 HC STATISTIC EKG NON-CHARGEABLE

## 2021-04-20 PROCEDURE — 82565 ASSAY OF CREATININE: CPT | Performed by: ANESTHESIOLOGY

## 2021-04-20 PROCEDURE — C1894 INTRO/SHEATH, NON-LASER: HCPCS | Performed by: INTERNAL MEDICINE

## 2021-04-20 DEVICE — IMPLANTABLE DEVICE: Type: IMPLANTABLE DEVICE | Site: AORTA | Status: FUNCTIONAL

## 2021-04-20 RX ORDER — ONDANSETRON 2 MG/ML
4 INJECTION INTRAMUSCULAR; INTRAVENOUS EVERY 30 MIN PRN
Status: DISCONTINUED | OUTPATIENT
Start: 2021-04-20 | End: 2021-04-20 | Stop reason: HOSPADM

## 2021-04-20 RX ORDER — CETIRIZINE HYDROCHLORIDE 10 MG/1
10 TABLET ORAL
COMMUNITY

## 2021-04-20 RX ORDER — POTASSIUM CHLORIDE 750 MG/1
20-40 TABLET, EXTENDED RELEASE ORAL
Status: DISCONTINUED | OUTPATIENT
Start: 2021-04-20 | End: 2021-04-21 | Stop reason: HOSPADM

## 2021-04-20 RX ORDER — METOPROLOL TARTRATE 50 MG
50 TABLET ORAL
Status: ON HOLD | COMMUNITY
Start: 2021-04-19 | End: 2021-04-21

## 2021-04-20 RX ORDER — HEPARIN SODIUM 1000 [USP'U]/ML
INJECTION, SOLUTION INTRAVENOUS; SUBCUTANEOUS PRN
Status: DISCONTINUED | OUTPATIENT
Start: 2021-04-20 | End: 2021-04-20

## 2021-04-20 RX ORDER — NALOXONE HYDROCHLORIDE 0.4 MG/ML
0.2 INJECTION, SOLUTION INTRAMUSCULAR; INTRAVENOUS; SUBCUTANEOUS
Status: ACTIVE | OUTPATIENT
Start: 2021-04-20 | End: 2021-04-21

## 2021-04-20 RX ORDER — ALLOPURINOL 300 MG/1
1 TABLET ORAL DAILY
COMMUNITY
Start: 2021-04-07

## 2021-04-20 RX ORDER — POTASSIUM CHLORIDE 7.45 MG/ML
10 INJECTION INTRAVENOUS
Status: DISCONTINUED | OUTPATIENT
Start: 2021-04-20 | End: 2021-04-21 | Stop reason: HOSPADM

## 2021-04-20 RX ORDER — AMOXICILLIN 250 MG
1-2 CAPSULE ORAL 2 TIMES DAILY
Status: DISCONTINUED | OUTPATIENT
Start: 2021-04-20 | End: 2021-04-21 | Stop reason: HOSPADM

## 2021-04-20 RX ORDER — ASPIRIN 81 MG/1
81 TABLET ORAL DAILY
Status: DISCONTINUED | OUTPATIENT
Start: 2021-04-21 | End: 2021-04-21 | Stop reason: HOSPADM

## 2021-04-20 RX ORDER — SODIUM CHLORIDE 9 MG/ML
INJECTION, SOLUTION INTRAVENOUS CONTINUOUS
Status: ACTIVE | OUTPATIENT
Start: 2021-04-20 | End: 2021-04-20

## 2021-04-20 RX ORDER — PROTAMINE SULFATE 10 MG/ML
INJECTION, SOLUTION INTRAVENOUS PRN
Status: DISCONTINUED | OUTPATIENT
Start: 2021-04-20 | End: 2021-04-20

## 2021-04-20 RX ORDER — ONDANSETRON 4 MG/1
4 TABLET, ORALLY DISINTEGRATING ORAL EVERY 6 HOURS PRN
Status: DISCONTINUED | OUTPATIENT
Start: 2021-04-20 | End: 2021-04-21 | Stop reason: HOSPADM

## 2021-04-20 RX ORDER — CEFAZOLIN SODIUM 2 G/100ML
2 INJECTION, SOLUTION INTRAVENOUS
Status: COMPLETED | OUTPATIENT
Start: 2021-04-20 | End: 2021-04-20

## 2021-04-20 RX ORDER — LIDOCAINE 40 MG/G
CREAM TOPICAL
Status: DISCONTINUED | OUTPATIENT
Start: 2021-04-20 | End: 2021-04-20 | Stop reason: HOSPADM

## 2021-04-20 RX ORDER — MAGNESIUM SULFATE HEPTAHYDRATE 40 MG/ML
4 INJECTION, SOLUTION INTRAVENOUS EVERY 4 HOURS PRN
Status: DISCONTINUED | OUTPATIENT
Start: 2021-04-20 | End: 2021-04-21 | Stop reason: HOSPADM

## 2021-04-20 RX ORDER — ONDANSETRON 4 MG/1
4 TABLET, ORALLY DISINTEGRATING ORAL EVERY 30 MIN PRN
Status: DISCONTINUED | OUTPATIENT
Start: 2021-04-20 | End: 2021-04-20 | Stop reason: HOSPADM

## 2021-04-20 RX ORDER — ACETAMINOPHEN 325 MG/1
325-650 TABLET ORAL EVERY 4 HOURS PRN
Status: DISCONTINUED | OUTPATIENT
Start: 2021-04-20 | End: 2021-04-21 | Stop reason: HOSPADM

## 2021-04-20 RX ORDER — LOSARTAN POTASSIUM 25 MG/1
25 TABLET ORAL DAILY
Status: DISCONTINUED | OUTPATIENT
Start: 2021-04-21 | End: 2021-04-21 | Stop reason: HOSPADM

## 2021-04-20 RX ORDER — ATROPINE SULFATE 0.1 MG/ML
INJECTION INTRAVENOUS PRN
Status: DISCONTINUED | OUTPATIENT
Start: 2021-04-20 | End: 2021-04-20

## 2021-04-20 RX ORDER — OXYCODONE HYDROCHLORIDE 10 MG/1
10 TABLET ORAL EVERY 4 HOURS PRN
Status: DISCONTINUED | OUTPATIENT
Start: 2021-04-20 | End: 2021-04-21 | Stop reason: HOSPADM

## 2021-04-20 RX ORDER — POTASSIUM CHLORIDE 29.8 MG/ML
20 INJECTION INTRAVENOUS
Status: DISCONTINUED | OUTPATIENT
Start: 2021-04-20 | End: 2021-04-21 | Stop reason: HOSPADM

## 2021-04-20 RX ORDER — APIXABAN 5 MG/1
5 TABLET, FILM COATED ORAL 2 TIMES DAILY
COMMUNITY
Start: 2021-03-23

## 2021-04-20 RX ORDER — NALOXONE HYDROCHLORIDE 0.4 MG/ML
0.4 INJECTION, SOLUTION INTRAMUSCULAR; INTRAVENOUS; SUBCUTANEOUS
Status: ACTIVE | OUTPATIENT
Start: 2021-04-20 | End: 2021-04-21

## 2021-04-20 RX ORDER — OXYCODONE HYDROCHLORIDE 5 MG/1
5 TABLET ORAL EVERY 4 HOURS PRN
Status: DISCONTINUED | OUTPATIENT
Start: 2021-04-20 | End: 2021-04-20

## 2021-04-20 RX ORDER — SODIUM CHLORIDE 9 MG/ML
INJECTION, SOLUTION INTRAVENOUS CONTINUOUS
Status: DISCONTINUED | OUTPATIENT
Start: 2021-04-20 | End: 2021-04-20 | Stop reason: HOSPADM

## 2021-04-20 RX ORDER — FENTANYL CITRATE 50 UG/ML
INJECTION, SOLUTION INTRAMUSCULAR; INTRAVENOUS PRN
Status: DISCONTINUED | OUTPATIENT
Start: 2021-04-20 | End: 2021-04-20

## 2021-04-20 RX ORDER — FENTANYL CITRATE 50 UG/ML
25-50 INJECTION, SOLUTION INTRAMUSCULAR; INTRAVENOUS
Status: DISCONTINUED | OUTPATIENT
Start: 2021-04-20 | End: 2021-04-20 | Stop reason: HOSPADM

## 2021-04-20 RX ORDER — HYDRALAZINE HYDROCHLORIDE 20 MG/ML
10 INJECTION INTRAMUSCULAR; INTRAVENOUS
Status: DISCONTINUED | OUTPATIENT
Start: 2021-04-20 | End: 2021-04-21 | Stop reason: HOSPADM

## 2021-04-20 RX ORDER — HYDROMORPHONE HYDROCHLORIDE 1 MG/ML
.3-.5 INJECTION, SOLUTION INTRAMUSCULAR; INTRAVENOUS; SUBCUTANEOUS EVERY 5 MIN PRN
Status: DISCONTINUED | OUTPATIENT
Start: 2021-04-20 | End: 2021-04-20 | Stop reason: HOSPADM

## 2021-04-20 RX ORDER — ONDANSETRON 2 MG/ML
4 INJECTION INTRAMUSCULAR; INTRAVENOUS EVERY 6 HOURS PRN
Status: DISCONTINUED | OUTPATIENT
Start: 2021-04-20 | End: 2021-04-21 | Stop reason: HOSPADM

## 2021-04-20 RX ORDER — ASPIRIN 325 MG
325 TABLET ORAL DAILY
Status: DISCONTINUED | OUTPATIENT
Start: 2021-04-20 | End: 2021-04-20 | Stop reason: HOSPADM

## 2021-04-20 RX ORDER — SODIUM CHLORIDE, SODIUM LACTATE, POTASSIUM CHLORIDE, CALCIUM CHLORIDE 600; 310; 30; 20 MG/100ML; MG/100ML; MG/100ML; MG/100ML
INJECTION, SOLUTION INTRAVENOUS CONTINUOUS PRN
Status: DISCONTINUED | OUTPATIENT
Start: 2021-04-20 | End: 2021-04-20

## 2021-04-20 RX ORDER — FUROSEMIDE 20 MG
20 TABLET ORAL
Status: DISCONTINUED | OUTPATIENT
Start: 2021-04-20 | End: 2021-04-21 | Stop reason: HOSPADM

## 2021-04-20 RX ORDER — CEFAZOLIN SODIUM 2 G/100ML
2 INJECTION, SOLUTION INTRAVENOUS EVERY 8 HOURS
Status: COMPLETED | OUTPATIENT
Start: 2021-04-20 | End: 2021-04-21

## 2021-04-20 RX ORDER — OXYCODONE HYDROCHLORIDE 5 MG/1
5 TABLET ORAL EVERY 4 HOURS PRN
Status: DISCONTINUED | OUTPATIENT
Start: 2021-04-20 | End: 2021-04-21 | Stop reason: HOSPADM

## 2021-04-20 RX ORDER — FUROSEMIDE 20 MG
TABLET ORAL
COMMUNITY
Start: 2021-04-19

## 2021-04-20 RX ORDER — NITROGLYCERIN 10 MG/100ML
INJECTION INTRAVENOUS PRN
Status: DISCONTINUED | OUTPATIENT
Start: 2021-04-20 | End: 2021-04-20

## 2021-04-20 RX ORDER — LOSARTAN POTASSIUM 25 MG/1
25 TABLET ORAL DAILY
COMMUNITY
Start: 2021-01-06

## 2021-04-20 RX ORDER — SODIUM CHLORIDE, SODIUM LACTATE, POTASSIUM CHLORIDE, CALCIUM CHLORIDE 600; 310; 30; 20 MG/100ML; MG/100ML; MG/100ML; MG/100ML
INJECTION, SOLUTION INTRAVENOUS CONTINUOUS
Status: DISCONTINUED | OUTPATIENT
Start: 2021-04-20 | End: 2021-04-20 | Stop reason: HOSPADM

## 2021-04-20 RX ORDER — POTASSIUM CHLORIDE 1.5 G/1.58G
20-40 POWDER, FOR SOLUTION ORAL
Status: DISCONTINUED | OUTPATIENT
Start: 2021-04-20 | End: 2021-04-21 | Stop reason: HOSPADM

## 2021-04-20 RX ORDER — ALLOPURINOL 300 MG/1
300 TABLET ORAL DAILY
Status: DISCONTINUED | OUTPATIENT
Start: 2021-04-21 | End: 2021-04-21 | Stop reason: HOSPADM

## 2021-04-20 RX ADMIN — Medication 4000 UNITS: at 11:41

## 2021-04-20 RX ADMIN — ATROPINE SULFATE 0.4 MG: 0.1 INJECTION, SOLUTION ENDOTRACHEAL; INTRAMUSCULAR; INTRAVENOUS; SUBCUTANEOUS at 11:56

## 2021-04-20 RX ADMIN — ASPIRIN 325 MG ORAL TABLET 325 MG: 325 PILL ORAL at 09:38

## 2021-04-20 RX ADMIN — Medication 2 G: at 10:45

## 2021-04-20 RX ADMIN — NITROGLYCERIN 100 MCG: 10 INJECTION INTRAVENOUS at 11:58

## 2021-04-20 RX ADMIN — CEFAZOLIN SODIUM 2 G: 2 INJECTION, SOLUTION INTRAVENOUS at 18:16

## 2021-04-20 RX ADMIN — PROTAMINE SULFATE 40 MG: 10 INJECTION, SOLUTION INTRAVENOUS at 12:07

## 2021-04-20 RX ADMIN — NITROGLYCERIN 100 MCG: 10 INJECTION INTRAVENOUS at 11:55

## 2021-04-20 RX ADMIN — APIXABAN 5 MG: 5 TABLET, FILM COATED ORAL at 20:09

## 2021-04-20 RX ADMIN — SODIUM CHLORIDE: 9 INJECTION, SOLUTION INTRAVENOUS at 13:47

## 2021-04-20 RX ADMIN — FENTANYL CITRATE 25 MCG: 50 INJECTION, SOLUTION INTRAMUSCULAR; INTRAVENOUS at 11:14

## 2021-04-20 RX ADMIN — FUROSEMIDE 20 MG: 20 TABLET ORAL at 18:37

## 2021-04-20 RX ADMIN — Medication 9000 UNITS: at 11:30

## 2021-04-20 RX ADMIN — SODIUM CHLORIDE, POTASSIUM CHLORIDE, SODIUM LACTATE AND CALCIUM CHLORIDE: 600; 310; 30; 20 INJECTION, SOLUTION INTRAVENOUS at 10:30

## 2021-04-20 RX ADMIN — MIDAZOLAM 1 MG: 1 INJECTION INTRAMUSCULAR; INTRAVENOUS at 10:40

## 2021-04-20 ASSESSMENT — ENCOUNTER SYMPTOMS: DYSRHYTHMIAS: 1

## 2021-04-20 ASSESSMENT — VISUAL ACUITY: OU: BASELINE

## 2021-04-20 ASSESSMENT — ACTIVITIES OF DAILY LIVING (ADL): ADLS_ACUITY_SCORE: 15

## 2021-04-20 ASSESSMENT — MIFFLIN-ST. JEOR: SCORE: 1285.88

## 2021-04-20 NOTE — ANESTHESIA POSTPROCEDURE EVALUATION
Patient: Radha Orona    Procedure(s):  CV TRANSCATHETER AORTIC VALVE REPLACEMENT  On standby    Diagnosis:valvular disease  Diagnosis Additional Information: No value filed.    Anesthesia Type:  MAC    Note:  Disposition: Admission   Postop Pain Control: Uneventful            Sign Out: Well controlled pain   PONV: No   Neuro/Psych: Uneventful            Sign Out: Acceptable/Baseline neuro status   Airway/Respiratory: Uneventful            Sign Out: Acceptable/Baseline resp. status   CV/Hemodynamics: Uneventful            Sign Out: Acceptable CV status   Other NRE: NONE   DID A NON-ROUTINE EVENT OCCUR? No         Last vitals:  Vitals:    04/20/21 1330 04/20/21 1345 04/20/21 1400   BP: 117/61 118/87 116/80   Pulse: 69 73 75   Resp: 15 28 20   Temp: 36.4  C (97.5  F)  36.4  C (97.5  F)   SpO2: 97% 99% 98%       Last vitals prior to Anesthesia Care Transfer:  CRNA VITALS  4/20/2021 1149 - 4/20/2021 1249      4/20/2021             ART BP:  (!) 287/287    ART Mean:  286    Resp Rate (observed):  28          Electronically Signed By: Adam Stevens MD  April 20, 2021  2:39 PM

## 2021-04-20 NOTE — OR NURSING
Writer was changing patient and noticed some oozing from her left femoral site dressing removed and Noted the the bleeding was coming from around the sutured site. Held pressure than Paged JOSE Silva at 410 pm return call at 412 pm. Ordering  writer to hold Pressure for another 15 minutes  Than apply a pressure dressing.  Dr Silva states if There is continuous bleeding after steps ordered nurse should contact [facility].   Writer called to 6C to update Manisha RN on changes in patient care.

## 2021-04-20 NOTE — BRIEF OP NOTE
[facility]    Brief Operative Note    Pre-operative diagnosis:  Severe aortic stenosis  Post-operative diagnosis Same    Procedure: BAV with a 23 mm Balloon                     Left transfemoral transcatheter aortic valve replacement using a 26 mm Fernandes Parth 3 Ultra valve  At nominal minus 1  Surgeon: Surgeon(s) and Role:  Panel 1:     * Sugey Chakraborty MD - Primary     * Abdullahi Rivera DO - Assisting     * Zackery Madrigal MD  Panel 2:     * Cammy Harris MD - Primary  Anesthesia: MAC with local  Estimated Blood Loss:  25 mL  Drains: None  Specimens: None  Findings: Minimal gradient without perivalvular leak  Mild Indentation of the left femoral artery after sheath removal.  Complications: None   Implants: The above valve and two perclose devices.

## 2021-04-20 NOTE — OP NOTE
Procedure Date: 04/20/2021      OPERATING AREA:  Room 5 in the Cath Lab.      PROCEDURES PERFORMED:   1.  Balloon aortic valvuloplasty using a 23 mm balloon.   2.  Left transfemoral transcatheter aortic valve replacement using a 26 mm Fernandes Parth 3 Ultra valve at nominal minus one.      ATTENDING SURGEON:  Cammy Harris MD      INTERVENTIONAL CARDIOLOGISTS:  Sugey Chakraborty MD and Zackery Madrigal MD      ANESTHESIA:  MAC with local.      SKIN PREP:  Betadine and DuraPrep.      INCISIONS:  None.      DRAINS:  None.      SPECIMEN:  None.      CULTURE:  None.      CLOSURE:  Two Perclose devices.      PREOPERATIVE DIAGNOSES:   1.  Severe aortic stenosis.   2.  Atrial fibrillation.      POSTOPERATIVE DIAGNOSES:   1.  Severe aortic stenosis.     2.  Atrial fibrillation.      BRIEF HISTORY:  Mrs. Orona is an 89-year-old woman who has experienced progressive symptoms related to her severe aortic stenosis.  She has also had long-standing atrial fibrillation.  Given her age, she was felt to be a better candidate to undergo transcatheter aortic valve replacement.  The above procedure was planned.      FINDINGS AT OPERATION:  Once the valve was deployed, there was a minimal gradient across the valve and no perivalvular leak.  There was also no pericardial effusion.  Once digital subtraction was performed, there was some mild indentation of the left femoral artery seen, but it was not felt to be flow limiting.  There was no extravasation.      DESCRIPTION OF PROCEDURE:  The patient arrived in the operating room, and MAC anesthesia was induced.  The patient's neck, chest, abdomen and both groins were prepped and draped in the standard sterile fashion.  Local anesthetic was applied to both groin areas, and the right femoral artery and vein were cannulated.  Through the right femoral vein, a temporary pacing wire was placed in the right ventricle.  It was tested and captured well.  The left femoral artery was then cannulated.   Heparin was administered.  The Fernandes E-sheath was passed up the left femoral artery.  The necessary intracardiac wires were placed by Dr. Chakraborty.  On the back table, a 23 mm balloon was prepared and brought onto the operative field.  It was passed up the Fernandes E-sheath and appropriately positioned.  Rapid pacing was done at 180 beats per minute, and the 23 mm balloon was deployed with a good result.   On the back table.  The 26 mm Fernandes Parth 3 Ultra valve was prepared at nominal minus one. When the ACT was appropriate, it was brought onto the operative field and passed up the Fernandes E-sheath.  It was appropriately positioned.  Again, rapid pacing was done at 180 beats per minute, and the valve was deployed.  Transthoracic echo then showed only a minimal gradient across the valve and no perivalvular leak.  There was also no pericardial effusion.  The valve deployment apparatus was removed, as was the Fernandes E- sheath.  Two Perclose devices were used to close the defect in the left femoral artery.  Digital subtraction was then performed, which showed some indentation at the site of the sheath insertion, but it was not felt to be flow limiting.  Protamine was administered to reverse the heparin.  Hemostasis was satisfactory.  The estimated blood loss was 25 mL.  The sponge, needle and instrument counts were reported as correct.  Mrs. Parker was brought to the recovery area in satisfactory condition.         SETH ABRAHAM MD             D: 2021   T: 2021   MT:       Name:     YANDY PARKER   MRN:      [id number]        Account:        [id number]   :      09/10/1931           Procedure Date: 2021      Document: [id number]

## 2021-04-20 NOTE — DISCHARGE INSTRUCTIONS
Patient Instructions - Going Home after TAVR:    Going Home: It s normal to feel a little anxious after leaving the hospital and when fewer people are nearby. People do much better when they feel as though they have support- if you live alone we suggest you arrange to have someone stay with you for a day or two to help you recover.     Medications:  Take all of your medications as directed in your discharge summary. Do not stop taking these medications without speaking with your cardiologist. If you have any questions about any of your medications, speak to your pharmacist or provider.     Follow up Appointments    You will follow up with Reshma Silva PA-C on May 6 at 9:50 am.    You will have a repeat echocardiogram on May 19 at 11 am.    You will have a follow up with Dr. Madrigal on May 20 at 1:30 am.  If you need to reschedule any of these appointments, please call:  [phone]      See your regular cardiologist in 6 months.  Your regular heart doctor will continue to be your heart specialist.     See your family doctor in 2 weeks.  Make an appointment once you get home.    You will receive a temporary  heart valve  wallet card. We recommend that you keep it in your wallet or purse at all times. You will be receiving a permanent wallet card from the  within 6 months.     Before an MRI (magnetic resonance imaging) procedure, always notify the doctor (or medical technician) that you have an implanted heart valve.     Site Care: Shower every day- let the water run over your incision or access site, but do not rub the area. No tub baths, pools or hot-tubs for the 1st week you are at home. Do not put ointments, powders, or lotions on your access site and/or incision.  It is normal to feel a small lump the size of a marble in the groin access site.  That is the closure device used to close the vein.  If you are experiencing discomfort, redness or increasing swelling, please call  us.    Dental Work: Avoid major dental work for the next 6 months if possible. You will need antibiotics before dental work or surgery. This would decrease the risk of infection.     Driving:  You should not drive for the first 2 weeks after your procedure. The first time you drive, you must have someone with you. You may ride in a car.     Activity: People recover at different rates depending on their general health and the type of heart valve procedure. Most people take about 4-10 weeks to feel fully recovered. Daily activity and exercise are an important part of your recovery.  Do not lift, push or pull anything > 10 lb. for the first 2 weeks.        CALL THE VALVE CLINIC IF YOU HAVE ANY QUESTIONS OR CONCERNS:  [phone]  For the first 2 weeks after TAVR     Do Not:     Lift, push, or pull more than ten pounds (including pets, laundry, groceries, etc)    Garden, including lawn mowing and raking    Excessively bear down or strain when having a bowel movement     Ride a bike   Do:    Weigh yourself every day in the morning after using the bathroom.  If you notice weight gain of more than 3 pounds in 1 day or more than 5 pounds in 1 week, call the cardiology clinic.     Get up and get dressed every day. Do not stay in bed.  Set a daily routine.     Walk as much as you can. You may walk up and down stairs. When you are tired, rest.     Cough and deep breathe. Use your incentive spirometer 5-10 times each hour when you are awake.     We strongly recommend you participate in a cardiac rehabilitation program. This type of program will help you learn about your heart health, prevent more heart problems, participate in safe and heart-healthy activities, and learn how to return to your activities of daily living and hobbies.     Let the cardiology clinic know if you need to leave town before your first follow-up visit.     When to call the Cardiology Clinic to speak with a nurse?     Swelling of the legs, ankles, or  feet    Increasing shortness of breath    Change in the color or temperature of your lower legs and feet    Abdominal pain or unrelieved nausea and/or vomiting    Redness and warmth around your access site and/or incision that does not go away    Yellow or green drainage from your access site and/or incision     Weight gain of 3 pounds in one day or 5 pounds in one week    Develop any numbness, tingling, or limited movement of your arms or legs.     Chest pain that does not go away    New confusion or you cannot think clearly    Fever and chills         [facility]:  [phone]  If you are calling after hours, please listen to the entire voicemail, a live  will answer at the end of the message

## 2021-04-20 NOTE — ANESTHESIA CARE TRANSFER NOTE
Patient: Radha Orona    Procedure(s):  CV TRANSCATHETER AORTIC VALVE REPLACEMENT  OR TRANSCATHETER AORTIC VALVE REPLACEMENT, OPEN FEMORAL ARTERY APPROACH    Diagnosis: valvular disease  Diagnosis Additional Information: No value filed.    Anesthesia Type:   MAC     Note:    Oropharynx: oropharynx clear of all foreign objects and spontaneously breathing  Level of Consciousness: awake  Oxygen Supplementation: nasal cannula  Level of Supplemental Oxygen (L/min / FiO2): 2  Independent Airway: airway patency satisfactory and stable  Dentition: dentition unchanged  Vital Signs Stable: post-procedure vital signs reviewed and stable  Report to RN Given: handoff report given  Patient transferred to: PACU  Comments: Awake, VSS tolerated anesthesia well  Handoff Report: Identifed the Patient, Identified the Reponsible Provider, Reviewed the pertinent medical history, Discussed the surgical course, Reviewed Intra-OP anesthesia mangement and issues during anesthesia, Set expectations for post-procedure period and Allowed opportunity for questions and acknowledgement of understanding      Vitals: (Last set prior to Anesthesia Care Transfer)  CRNA VITALS  4/20/2021 1149 - 4/20/2021 1229      4/20/2021             ART BP:  (!) 287/287    ART Mean:  286    Resp Rate (observed):  28        Electronically Signed By: KAMILAH Rahman CRNA  April 20, 2021  12:29 PM

## 2021-04-20 NOTE — Clinical Note
Prepped: groin, right radial, chest, abdomen and neck. Prepped with: ChloraPrep. The patient was draped. .Pre-procedure site marking:Insertion site not predetermined

## 2021-04-20 NOTE — OR NURSING
JOSE Silva saw patient at bedside. Reviewed labs and EKG. Patient okay to transfer from PACU when bed on 6C is available.

## 2021-04-21 ENCOUNTER — APPOINTMENT (OUTPATIENT)
Dept: OCCUPATIONAL THERAPY | Facility: CLINIC | Age: 86
DRG: 267 | End: 2021-04-21
Attending: INTERNAL MEDICINE
Payer: MEDICARE

## 2021-04-21 ENCOUNTER — PATIENT OUTREACH (OUTPATIENT)
Dept: CARE COORDINATION | Facility: CLINIC | Age: 86
End: 2021-04-21

## 2021-04-21 ENCOUNTER — APPOINTMENT (OUTPATIENT)
Dept: CARDIOLOGY | Facility: CLINIC | Age: 86
DRG: 267 | End: 2021-04-21
Attending: INTERNAL MEDICINE
Payer: MEDICARE

## 2021-04-21 ENCOUNTER — APPOINTMENT (OUTPATIENT)
Dept: GENERAL RADIOLOGY | Facility: CLINIC | Age: 86
DRG: 267 | End: 2021-04-21
Attending: INTERNAL MEDICINE
Payer: MEDICARE

## 2021-04-21 VITALS
SYSTOLIC BLOOD PRESSURE: 99 MMHG | WEIGHT: 188.5 LBS | DIASTOLIC BLOOD PRESSURE: 54 MMHG | HEART RATE: 75 BPM | HEIGHT: 65 IN | BODY MASS INDEX: 31.4 KG/M2 | OXYGEN SATURATION: 95 % | TEMPERATURE: 98.2 F | RESPIRATION RATE: 18 BRPM

## 2021-04-21 LAB
ANION GAP SERPL CALCULATED.3IONS-SCNC: 6 MMOL/L (ref 3–14)
BUN SERPL-MCNC: 40 MG/DL (ref 7–30)
CALCIUM SERPL-MCNC: 9 MG/DL (ref 8.5–10.1)
CHLORIDE SERPL-SCNC: 102 MMOL/L (ref 94–109)
CO2 SERPL-SCNC: 29 MMOL/L (ref 20–32)
CREAT SERPL-MCNC: 1.36 MG/DL (ref 0.52–1.04)
ERYTHROCYTE [DISTWIDTH] IN BLOOD BY AUTOMATED COUNT: 14.8 % (ref 10–15)
GFR SERPL CREATININE-BSD FRML MDRD: 34 ML/MIN/{1.73_M2}
GLUCOSE SERPL-MCNC: 118 MG/DL (ref 70–99)
HCT VFR BLD AUTO: 32 % (ref 35–47)
HGB BLD-MCNC: 9.8 G/DL (ref 11.7–15.7)
MAGNESIUM SERPL-MCNC: 2.2 MG/DL (ref 1.6–2.3)
MCH RBC QN AUTO: 28.8 PG (ref 26.5–33)
MCHC RBC AUTO-ENTMCNC: 30.6 G/DL (ref 31.5–36.5)
MCV RBC AUTO: 94 FL (ref 78–100)
PLATELET # BLD AUTO: 134 10E9/L (ref 150–450)
POTASSIUM SERPL-SCNC: 3.7 MMOL/L (ref 3.4–5.3)
RBC # BLD AUTO: 3.4 10E12/L (ref 3.8–5.2)
SODIUM SERPL-SCNC: 137 MMOL/L (ref 133–144)
WBC # BLD AUTO: 8.1 10E9/L (ref 4–11)

## 2021-04-21 PROCEDURE — 97165 OT EVAL LOW COMPLEX 30 MIN: CPT | Mod: GO

## 2021-04-21 PROCEDURE — 71045 X-RAY EXAM CHEST 1 VIEW: CPT

## 2021-04-21 PROCEDURE — 71045 X-RAY EXAM CHEST 1 VIEW: CPT | Mod: 26 | Performed by: RADIOLOGY

## 2021-04-21 PROCEDURE — 85027 COMPLETE CBC AUTOMATED: CPT | Performed by: INTERNAL MEDICINE

## 2021-04-21 PROCEDURE — 250N000013 HC RX MED GY IP 250 OP 250 PS 637: Performed by: INTERNAL MEDICINE

## 2021-04-21 PROCEDURE — 83735 ASSAY OF MAGNESIUM: CPT | Performed by: INTERNAL MEDICINE

## 2021-04-21 PROCEDURE — 97535 SELF CARE MNGMENT TRAINING: CPT | Mod: GO

## 2021-04-21 PROCEDURE — 250N000011 HC RX IP 250 OP 636: Performed by: INTERNAL MEDICINE

## 2021-04-21 PROCEDURE — 93005 ELECTROCARDIOGRAM TRACING: CPT

## 2021-04-21 PROCEDURE — 93010 ELECTROCARDIOGRAM REPORT: CPT | Performed by: INTERNAL MEDICINE

## 2021-04-21 PROCEDURE — 80048 BASIC METABOLIC PNL TOTAL CA: CPT | Performed by: INTERNAL MEDICINE

## 2021-04-21 PROCEDURE — 250N000013 HC RX MED GY IP 250 OP 250 PS 637: Performed by: PHYSICIAN ASSISTANT

## 2021-04-21 PROCEDURE — 93306 TTE W/DOPPLER COMPLETE: CPT

## 2021-04-21 PROCEDURE — 93306 TTE W/DOPPLER COMPLETE: CPT | Mod: 26 | Performed by: INTERNAL MEDICINE

## 2021-04-21 PROCEDURE — 99239 HOSP IP/OBS DSCHRG MGMT >30: CPT | Mod: 25 | Performed by: PHYSICIAN ASSISTANT

## 2021-04-21 PROCEDURE — 36415 COLL VENOUS BLD VENIPUNCTURE: CPT | Performed by: INTERNAL MEDICINE

## 2021-04-21 RX ORDER — METOPROLOL TARTRATE 50 MG
50 TABLET ORAL 2 TIMES DAILY
Qty: 60 TABLET | Refills: 3
Start: 2021-04-21

## 2021-04-21 RX ORDER — METOPROLOL TARTRATE 50 MG
50 TABLET ORAL 2 TIMES DAILY
Status: DISCONTINUED | OUTPATIENT
Start: 2021-04-21 | End: 2021-04-21 | Stop reason: HOSPADM

## 2021-04-21 RX ORDER — METOPROLOL TARTRATE 25 MG/1
25 TABLET, FILM COATED ORAL 2 TIMES DAILY
Status: DISCONTINUED | OUTPATIENT
Start: 2021-04-21 | End: 2021-04-21

## 2021-04-21 RX ADMIN — FUROSEMIDE 20 MG: 20 TABLET ORAL at 07:40

## 2021-04-21 RX ADMIN — LOSARTAN POTASSIUM 25 MG: 25 TABLET, FILM COATED ORAL at 07:41

## 2021-04-21 RX ADMIN — DOCUSATE SODIUM 50 MG AND SENNOSIDES 8.6 MG 1 TABLET: 8.6; 5 TABLET, FILM COATED ORAL at 07:53

## 2021-04-21 RX ADMIN — APIXABAN 5 MG: 5 TABLET, FILM COATED ORAL at 07:41

## 2021-04-21 RX ADMIN — METOPROLOL TARTRATE 25 MG: 25 TABLET, FILM COATED ORAL at 11:58

## 2021-04-21 RX ADMIN — FUROSEMIDE 20 MG: 20 TABLET ORAL at 15:35

## 2021-04-21 RX ADMIN — ASPIRIN 81 MG: 81 TABLET, DELAYED RELEASE ORAL at 07:40

## 2021-04-21 RX ADMIN — ALLOPURINOL 300 MG: 300 TABLET ORAL at 07:41

## 2021-04-21 RX ADMIN — CEFAZOLIN SODIUM 2 G: 2 INJECTION, SOLUTION INTRAVENOUS at 11:03

## 2021-04-21 RX ADMIN — CEFAZOLIN SODIUM 2 G: 2 INJECTION, SOLUTION INTRAVENOUS at 03:00

## 2021-04-21 ASSESSMENT — MIFFLIN-ST. JEOR: SCORE: 1280.91

## 2021-04-21 ASSESSMENT — ACTIVITIES OF DAILY LIVING (ADL)
ADLS_ACUITY_SCORE: 17
PREVIOUS_RESPONSIBILITIES: MEAL PREP;LAUNDRY;MEDICATION MANAGEMENT;FINANCES
ADLS_ACUITY_SCORE: 20
ADLS_ACUITY_SCORE: 18
ADLS_ACUITY_SCORE: 17
ADLS_ACUITY_SCORE: 20

## 2021-04-21 NOTE — DISCHARGE SUMMARY
[address] [address]  p: [phone]    Discharge Summary: Cardiology Service    Radha Orona MRN# [id number]   YOB: 1931 Age: 89 year old       Admission Date: 4/20/2021  Discharge Date: 04/21/2021      Discharge Diagnoses:  1. Severe aortic stenosis s/p TAVR  2. Moderate MR  3. Hypertension  4. HLD  5. Non obstructive CAD    Pertinent Procedures:  1. TAVR    Consults:  NA    Imaging with results:  Echocardiogram 4/21/2021:  Left Ventricle  Global and regional left ventricular function is normal with an EF of 60-65%.  Left ventricular size is normal. Borderline concentric wall thickening  consistent with left ventricular hypertrophy is present. Diastolic function  not assessed due to atrial fibrillation. No regional wall motion abnormalities  are seen.     Right Ventricle  Right ventricular function, chamber size, wall motion, and thickness are  normal.     Atria  The atria cannot be assessed.     Mitral Valve  Mild to moderate mitral annular calcification is present. Trace mitral  insufficiency is present.     Aortic Valve  S/P TAVR with 26mm ES3 valve on 4/20/2021. Mean aortic gradient 6mmHg. No AI.     Tricuspid Valve  The tricuspid valve is normal. Trace to mild tricuspid insufficiency is  present. The right ventricular systolic pressure is approximated at 47.4 mmHg  plus the right atrial pressure.     Pulmonic Valve  The valve leaflets are not well visualized. Trace pulmonic insufficiency is  present.     Vessels  The pulmonary artery cannot be assessed. Ascending aorta 3.7 cm. IVC diameter  and respiratory changes fall into an intermediate range suggesting an RA  pressure of 8 mmHg.     Pericardium  No pericardial effusion is present.       Brief HPI:  Radha Orona is an 89 year old female with a past medical history significant for HTN, paroxysmal atrial fibrillation, HLD, CKD 3, gout, moderate MR, and severe symptomatic aortic  stenosis.    Hospital Course by Diagnosis:  # Severe aortic stenosis  ##Moderate MR  ##Transient complete heart block while on table - resolved  now s/p 26mm ES3 Transcatheter Aortic Valve Replacement. Prior to procedure, pt noted to have an CRISTHIAN 0.6 cm^2- and DI of 0.2. Sheath sites soft without hematoma. Noted to have short period of CHB then suspected junctional escape rhythm during procedure. POD 1 patient feels well, and notices an immediate improvement with exertional dyspnea. No further rhythm issues appreciated on tele or EKG. Post op TTE with gradient of 6mmHg, no AI  - aspirin 81mg daily  - resume PTA lasix      #Parosyxmal atrial fibrillation   Recently diagnosed - on metoprolol tartrate and apixaban at baseline.   - resume metoprolol tartrate 50mg bid  - resume apixaban 5mg bid     #HTN  #HLD  #Non obstructive CAD on angiogram 3/2021  - aspirin as above  - resume losartan 25mg         Condition on discharge  Temp:  [95.2  F (35.1  C)-98.5  F (36.9  C)] 98.5  F (36.9  C)  Pulse:  [48-85] 74  Resp:  [0-28] 18  BP: (102-136)/(53-90) 116/74  SpO2:  [94 %-100 %] 98 %  General: Alert, interactive, NAD  Eyes: sclera anicteric, EOMI  Neck: JVP flat, carotid 2+ bilaterally  Cardiovascular: irregular rhythm,  Normal rate, normal S1 and S2, no murmurs, gallops, or rubs  Resp: clear to auscultation bilaterally, no rales, wheezes, or rhonchi  GI: Soft, nontender, nondistended. +BS.  No HSM or masses, no rebound or guarding.  Extremities: trace edema, no cyanosis or clubbing, dorsalis pedis and posterior tibialis pulses 2+ bilaterally  Skin: Warm and dry, no jaundice or rash  Neuro: CN 2-12 intact, moves all extremities equally  Psych: Alert & oriented x 3      Medication Changes:    Discharge medications:   Discharge Medication List as of 4/21/2021  6:09 PM      START taking these medications    Details   aspirin (ASA) 81 MG EC tablet Take 1 tablet (81 mg) by mouth daily, Disp-30 tablet, R-3, E-Prescribe          CONTINUE these medications which have CHANGED    Details   metoprolol tartrate (LOPRESSOR) 50 MG tablet Take 1 tablet (50 mg) by mouth 2 times daily, Disp-60 tablet, R-3, No Print Out         CONTINUE these medications which have NOT CHANGED    Details   allopurinol (ZYLOPRIM) 300 MG tablet Take 1 tablet by mouth daily, Historical      cetirizine (ZYRTEC) 10 MG tablet Take 10 mg by mouth, Historical      cholecalciferol 25 MCG (1000 UT) TABS Take 1,000 Units by mouth, Historical      ELIQUIS ANTICOAGULANT 5 MG tablet Take 5 mg by mouth 2 times daily, SHEA, Historical      furosemide (LASIX) 20 MG tablet Historical      losartan (COZAAR) 25 MG tablet Take 25 mg by mouth daily, Historical             Labs or imaging requiring follow-up after discharge:  Per [facility] valve team      Follow-up:  [facility] valve clinic as previously arranged    Code status:  FULL    Patient discussed with Dr. Mathews    60 minutes spent in discharge, including >50% in counseling and coordination of care, medication review and plan of care recommended on follow up. Questions were answered.   It was our pleasure to care for Radha Orona during this hospitalization. Please do not hesitate to contact me should there be questions regarding the hospital course or discharge plan.        Omar Will PA-C  [facility] Cardiology Team        I have seen and examined the patient with the CSI team. I agree with the assessment and plan of the discharge summary note above.I have reviewed pertinent labs. More than 30 minutes were spent organizing the patient's discharge.    Zachery Mathews MD  Interventional Cardiology  Pager: [phone]

## 2021-04-21 NOTE — PLAN OF CARE
Pt is a 89 year old with history of aortic stenosis, HTN, HLD, and nonobstructive CAD, admitted for TVAR procedure 4/20/2021.    Monitored pt and recorded vitals. Prepared pt for discharge.    A: Alert and oriented x 4. Pt has chronic afib and is asymptomatic. Afebrile this shift. Pt denied pain. TVAR sites WDL.    Temp:98.2 F  Pulse: 75bpm  BP: 99/54mmHg, MAP 78mmHg  Respiratory: rate 18 breaths per minute, breath sounds were clear, oxygen saturations were 95% on room air    Diet: 2g sodium  Mobility: standby assist with walker    P: Give pt meds per MAR and prepare for discharge. Ensure pt comfort. Continue with plan of care. Pt verbalizes acceptance with plan.     Iesha Veloz RN

## 2021-04-21 NOTE — PROGRESS NOTES
SPIRITUAL HEALTH SERVICES  SPIRITUAL ASSESSMENT Progress Note  [facility] 6C   ON-CALL VISIT    REFERRAL SOURCE: Patient request at admission       Reflective visit about success of procedure. Patient affirmed feeling grateful about how smoothly her surgery went, and her impending discharge. Patient named alison in God as well as children (8 total; 5 who are local) as important sources of support.       PLAN: No follow up anticipated given expected discharge. Spiritual Health Services remains available for patient, family, and staff support.     Please page the on call  either via [employer] (Spiritual Health/[facility]) or by placing a STAT or ASAP Epic referral for Spiritual Health (which will roll to the on call pager).        Kelle López  Chaplain Resident  Pager: [phone]

## 2021-04-21 NOTE — PLAN OF CARE
"/74 (BP Location: Left arm)   Pulse 74   Temp 98.5  F (36.9  C) (Oral)   Resp 18   Ht 1.651 m (5' 5\")   Wt 85.5 kg (188 lb 8 oz)   SpO2 98%   BMI 31.37 kg/m      D: Planned TAVR on 4/20/21.  I/A: Patient is on oxygen at HS with 2L, denies pain and nausea.  On tele with Afib in 90s-100s, bilateral groin with dressing c/d/i, pressure dressing of L-groin d/t re-oozing in PACU.  Patient with weak pulses upon palpitation, and Q2hrs neuro are intact and she is up with walker with SBA and voiding spontaneously.  P: Will continue to monitor and notify MD of status changes.  "

## 2021-04-21 NOTE — PLAN OF CARE
D: Patient was admitted on 4/20/2021. A past medical history significant for HTN, paroxysmal atrial fibrillation, HLD, CKD 3, gout, moderate MR, and severe symptomatic aortic stenosis. Patient was recently seen in clinic at [facility] for progressive functional decline 2/2 dyspnea with exertion that she first noted ~1 year ago. Per notation, she was recently hospitalized in February for new afib as well as a heart failure exaceration. TTE demonstrated normal function as well as severe AS. Patient ultimately referred for TAVR with the [facility] group..She  had a TAVR on 4/20/2021.     I: Monitored vitals and assessed patient status  Running: R piv that is saline locked    A: Patient's vital signs have been stable. Rhythm was Afib 70-90 with an isolated PVC and PVC couplet. She voided twice since she missed the hat.    I/O this shift:  In: 440 [P.O.:440]  Out: -     Temp:  [98  F (36.7  C)-98.5  F (36.9  C)] 98.5  F (36.9  C)  Pulse:  [68-85] 74  Resp:  [0-18] 18  BP: (102-136)/(53-90) 116/74  SpO2:  [94 %-99 %] 98 %      P: Continue to monitor patient status and report changes to treatment team.

## 2021-04-21 NOTE — PROGRESS NOTES
04/21/21 1600   Quick Adds   Type of Visit Initial Occupational Therapy Evaluation   Living Environment   People in home alone   Current Living Arrangements house   Home Accessibility no concerns   Transportation Anticipated car, drives self;family or friend will provide   Living Environment Comments Pt lives alone in [facility]. No stairs. Has walk in shower, grab bars, shower chair. Pt will have assist PRN from family, one member will be staying with her for a while post-op   Self-Care   Usual Activity Tolerance good   Current Activity Tolerance moderate   Regular Exercise No   Equipment Currently Used at Home walker, standard;grab bar, tub/shower   Activity/Exercise/Self-Care Comment IND at baseline   Disability/Function   Hearing Difficulty or Deaf no   Were auxiliary aids offered? no   Wear Glasses or Blind yes   Vision Management readers   Concentrating, Remembering or Making Decisions Difficulty no   Difficulty Communicating no   Difficulty Eating/Swallowing no   Walking or Climbing Stairs Difficulty no   Dressing/Bathing Difficulty no   Toileting issues no   Doing Errands Independently Difficulty (such as shopping) no   Fall history within last six months yes   Number of times patient has fallen within last six months 1   Change in Functional Status Since Onset of Current Illness/Injury yes   General Information   Onset of Illness/Injury or Date of Surgery 04/20/21   Referring Physician Zackery Madrigal MD   Patient/Family Therapy Goal Statement (OT) Return home   Additional Occupational Profile Info/Pertinent History of Current Problem Patient was admitted on 4/20/2021. A past medical history significant for HTN, paroxysmal atrial fibrillation, HLD, CKD 3, gout, moderate MR, and severe symptomatic aortic stenosis. Patient was recently seen in clinic at [facility] for progressive functional decline 2/2 dyspnea with exertion that she first noted ~1 year ago. Per notation, she was recently hospitalized in  February for new afib as well as a heart failure exaceration. TTE demonstrated normal function as well as severe AS. Patient ultimately referred for TAVR with the [facility] group..She  had a TAVR on 4/20/2021.    Existing Precautions/Restrictions cardiac;fall;other (see comments)  (TAVR)   Left Upper Extremity (Weight-bearing Status) other (see comments)  (10#)   Right Upper Extremity (Weight-bearing Status) other (see comments)  (10#)   Left Lower Extremity (Weight-bearing Status) full weight-bearing (FWB)   Right Lower Extremity (Weight-bearing Status) full weight-bearing (FWB)   General Observations and Info Activity: Ambulate   Cognitive Status Examination   Orientation Status orientation to person, place and time   Cognitive Status Comments Appears WFL, will continue to monitor   Visual Perception   Visual Impairment/Limitations corrective lenses for reading   Sensory   Sensory Quick Adds No deficits were identified   Pain Assessment   Patient Currently in Pain No   Integumentary/Edema   Integumentary/Edema no deficits were identifed   Posture   Posture not impaired   Range of Motion Comprehensive   General Range of Motion bilateral upper extremity ROM WFL   Strength Comprehensive (MMT)   General Manual Muscle Testing (MMT) Assessment no strength deficits identified   Muscle Tone Assessment   Muscle Tone Quick Adds No deficits were identified   Coordination   Upper Extremity Coordination No deficits were identified   Instrumental Activities of Daily Living (IADL)   Previous Responsibilities meal prep;laundry;medication management;finances   IADL Comments Has assist w/ cleaning, 5 kids live nearby and assist PRN   Clinical Impression   Criteria for Skilled Therapeutic Interventions Met (OT) yes;meets criteria;skilled treatment is necessary   OT Diagnosis Decreased ADL/IADL I   OT Problem List-Impairments impacting ADL problems related to;activity tolerance impaired;strength;post-surgical precautions    Assessment of Occupational Performance 3-5 Performance Deficits   Identified Performance Deficits Dressing, bathing, toileting, home mgmt   Planned Therapy Interventions (OT) ADL retraining;IADL retraining;strengthening;home program guidelines;progressive activity/exercise;risk factor education   Clinical Decision Making Complexity (OT) low complexity   Therapy Frequency (OT) 6x/week   Predicted Duration of Therapy 1 week   Anticipated Equipment Needs Upon Discharge (OT) other (see comments)  (tbd)   Risk & Benefits of therapy have been explained evaluation/treatment results reviewed;care plan/treatment goals reviewed;current/potential barriers reviewed;participants voiced agreement with care plan;participants included;patient;son   Comment-Clinical Impression Pt will benefit from skilled OT services to progress ADL/IADL I    OT Discharge Planning    OT Discharge Recommendation (DC Rec) Home with assist   OT Rationale for DC Rec Pt is near baseline, performing ADLs w/ min A-ind. Pt will have support from family who all live nearby [facility]. Anticipate pt will be appropriate to d/c home when medically ready   OT Brief overview of current status  SBA ambulation w/ FWW   Total Evaluation Time (Minutes)   Total Evaluation Time (Minutes) 5

## 2021-04-21 NOTE — PLAN OF CARE
Patient is s/p planned TAVR 4/20.  Sheaths pulled prior to 6C arrival.    Neuro: A&Ox4. Neuros intact.  Cardiac: Afebrile, VSS, chronic afib rate controlled.   Respiratory: RA, capno off.  GI/: Voiding spontaneously. BM 4/19.   Diet/appetite: passed bedside swallow test. Tolerating cardiac diet. Denies nausea.  Activity: Up with SB assist to bathroom. GB/walker.   Pain: . Denies   Skin: Bilateral groin sites. Left groin with pressure dressing placed in PACU. No new signs of bleeding. Right groin with primapore, oozing marked in PACU unchanged, area soft.  Lines: PIV SL'd for intermittent post procedure ancef.   Replacements:     Continue post procedure assessments. TTE in am.

## 2021-04-22 ENCOUNTER — COMMUNICATION - HEALTHEAST (OUTPATIENT)
Dept: CARDIOLOGY | Facility: CLINIC | Age: 86
End: 2021-04-22

## 2021-04-22 NOTE — PROGRESS NOTES
[facility]: Post-Discharge Note  SITUATION                                                      Admission:    Admission Date: 04/20/21   Reason for Admission: Severe aortic stenosis s/p TAVR  Discharge:   Discharge Date: 04/21/21  Discharge Diagnosis: Severe aortic stenosis s/p TAVR  Discharge Service: Cardiology    BACKGROUND                                                      Radha Orona is an 89 year old female with a past medical history significant for HTN, paroxysmal atrial fibrillation, HLD, CKD 3, gout, moderate MR, and severe symptomatic aortic stenosis.    ASSESSMENT      Discharge Assessment  Patient reports symptoms are: Improved  Does the patient have all of their medications?: Yes  Does patient know what their new medications are for?: Yes(Patient reports she is on Eliquis and is wondering why the aspirin 81mg was added)  Does patient have a follow-up appointment scheduled?: Yes  Does patient have any other questions or concerns?: No    Post-op  Did the patient have surgery or a procedure: Yes  Drainage: No  Bleeding: none  Fever: No  Chills: No  Redness: No  Warmth: No  Swelling: No  Incision site pain: No  Eating & Drinking: eating and drinking without complaints/concerns  PO Intake: regular diet  Bowel Function: normal  Urinary Status: voiding without complaint/concerns    PLAN                                                      Outpatient Plan:      [facility] valve clinic as previously arranged    No future appointments.        Nadya Spaulding, CMA

## 2021-04-22 NOTE — PLAN OF CARE
Occupational Therapy Discharge Summary    Reason for therapy discharge:    Discharged to home w/assist     Progress towards therapy goal(s). See goals on Care Plan in [facility] electronic health record for goal details.  Goals partially met.  Barriers to achieving goals:   discharge from facility.    Therapy recommendation(s):    No further therapy is recommended.

## 2021-04-22 NOTE — DISCHARGE SUMMARY
DISCHARGE   Discharged to: Home  Via: Automobile  Accompanied by: Family (son)  Discharge Instructions: Pt had successful TVAR procedure. Pt educated per discharge instructions on medications, cardiac rehab, activity and bathing restrictions, s/s to look for and report, and bleeding precautions. Pt appointments given in discharge instructions with first appointment 5/6/2021. Pt states understanding of all discharge instructions.   Prescriptions: To be filled by discharge pharmacy upon exit.  Follow Up Appointments: Per discharge instructions with first appointment scheduled 5/6/2021.  Belongings: All sent with pt. Pt verifies that they are taking all belongings with them.   IV: discontinued.   Telemetry: discontinued.   Pt exhibits understanding of above discharge instructions; all questions answered.  Discharge Paperwork: faxed to discharge planner.

## 2021-04-22 NOTE — PROGRESS NOTES
The patient was contacted for a post-hospital call and is questioning why a baby aspirin was added when she is already taking Eliquis.     Please contact patient to discuss further.

## 2021-04-23 LAB
INTERPRETATION ECG - MUSE: NORMAL

## 2021-04-28 ENCOUNTER — RECORDS - HEALTHEAST (OUTPATIENT)
Dept: ADMINISTRATIVE | Facility: OTHER | Age: 86
End: 2021-04-28

## 2021-04-28 ENCOUNTER — AMBULATORY - HEALTHEAST (OUTPATIENT)
Dept: ADMINISTRATIVE | Facility: REHABILITATION | Age: 86
End: 2021-04-28
Payer: MEDICARE

## 2021-04-28 DIAGNOSIS — Z95.2 S/P TAVR (TRANSCATHETER AORTIC VALVE REPLACEMENT): ICD-10-CM

## 2021-05-03 ENCOUNTER — RECORDS - HEALTHEAST (OUTPATIENT)
Dept: ADMINISTRATIVE | Facility: OTHER | Age: 86
End: 2021-05-03

## 2021-05-03 ENCOUNTER — COMMUNICATION - HEALTHEAST (OUTPATIENT)
Dept: CARDIOLOGY | Facility: CLINIC | Age: 86
End: 2021-05-03

## 2021-05-04 ENCOUNTER — RECORDS - HEALTHEAST (OUTPATIENT)
Dept: ADMINISTRATIVE | Facility: OTHER | Age: 86
End: 2021-05-04

## 2021-05-05 ENCOUNTER — RECORDS - HEALTHEAST (OUTPATIENT)
Dept: ADMINISTRATIVE | Facility: OTHER | Age: 86
End: 2021-05-05

## 2021-05-06 ENCOUNTER — COMMUNICATION - HEALTHEAST (OUTPATIENT)
Dept: CARDIOLOGY | Facility: CLINIC | Age: 86
End: 2021-05-06

## 2021-05-06 ENCOUNTER — COMMUNICATION - HEALTHEAST (OUTPATIENT)
Dept: CARDIOLOGY | Facility: CLINIC | Age: 86
End: 2021-05-06
Payer: MEDICARE

## 2021-05-06 ENCOUNTER — OFFICE VISIT - HEALTHEAST (OUTPATIENT)
Dept: CARDIOLOGY | Facility: CLINIC | Age: 86
End: 2021-05-06

## 2021-05-06 DIAGNOSIS — I50.32 CHRONIC HEART FAILURE WITH PRESERVED EJECTION FRACTION (H): ICD-10-CM

## 2021-05-06 DIAGNOSIS — Z95.2 S/P TAVR (TRANSCATHETER AORTIC VALVE REPLACEMENT): ICD-10-CM

## 2021-05-06 DIAGNOSIS — I35.0 SEVERE AORTIC STENOSIS: ICD-10-CM

## 2021-05-06 DIAGNOSIS — I48.19 PERSISTENT ATRIAL FIBRILLATION (H): ICD-10-CM

## 2021-05-06 DIAGNOSIS — N18.31 STAGE 3A CHRONIC KIDNEY DISEASE (H): ICD-10-CM

## 2021-05-10 ENCOUNTER — COMMUNICATION - HEALTHEAST (OUTPATIENT)
Dept: CARDIOLOGY | Facility: CLINIC | Age: 86
End: 2021-05-10

## 2021-05-10 DIAGNOSIS — R09.89 OTHER SPECIFIED SYMPTOMS AND SIGNS INVOLVING THE CIRCULATORY AND RESPIRATORY SYSTEMS: ICD-10-CM

## 2021-05-10 DIAGNOSIS — Z95.2 S/P TAVR (TRANSCATHETER AORTIC VALVE REPLACEMENT): ICD-10-CM

## 2021-05-11 ENCOUNTER — COMMUNICATION - HEALTHEAST (OUTPATIENT)
Dept: CARDIOLOGY | Facility: CLINIC | Age: 86
End: 2021-05-11

## 2021-05-11 ENCOUNTER — AMBULATORY - HEALTHEAST (OUTPATIENT)
Dept: LAB | Facility: CLINIC | Age: 86
End: 2021-05-11

## 2021-05-11 ENCOUNTER — HOSPITAL ENCOUNTER (OUTPATIENT)
Dept: ULTRASOUND IMAGING | Facility: CLINIC | Age: 86
Discharge: HOME | End: 2021-05-11
Payer: MEDICARE

## 2021-05-11 DIAGNOSIS — R09.89 OTHER SPECIFIED SYMPTOMS AND SIGNS INVOLVING THE CIRCULATORY AND RESPIRATORY SYSTEMS: ICD-10-CM

## 2021-05-11 DIAGNOSIS — Z95.2 S/P TAVR (TRANSCATHETER AORTIC VALVE REPLACEMENT): ICD-10-CM

## 2021-05-11 LAB
ANION GAP SERPL CALCULATED.3IONS-SCNC: 14 MMOL/L (ref 5–18)
BUN SERPL-MCNC: 44 MG/DL (ref 8–28)
CALCIUM SERPL-MCNC: 9.5 MG/DL (ref 8.5–10.5)
CHLORIDE BLD-SCNC: 97 MMOL/L (ref 98–107)
CO2 SERPL-SCNC: 28 MMOL/L (ref 22–31)
CREAT SERPL-MCNC: 1.15 MG/DL (ref 0.6–1.1)
GFR SERPL CREATININE-BSD FRML MDRD: 44 ML/MIN/1.73M2
GLUCOSE BLD-MCNC: 130 MG/DL (ref 70–125)
POTASSIUM BLD-SCNC: 4.1 MMOL/L (ref 3.5–5)
SODIUM SERPL-SCNC: 139 MMOL/L (ref 136–145)

## 2021-05-17 ENCOUNTER — AMBULATORY - HEALTHEAST (OUTPATIENT)
Dept: CARDIOLOGY | Facility: CLINIC | Age: 86
End: 2021-05-17

## 2021-05-17 DIAGNOSIS — Z95.2 S/P TAVR (TRANSCATHETER AORTIC VALVE REPLACEMENT): ICD-10-CM

## 2021-05-18 ENCOUNTER — RECORDS - HEALTHEAST (OUTPATIENT)
Dept: ADMINISTRATIVE | Facility: OTHER | Age: 86
End: 2021-05-18

## 2021-05-18 ENCOUNTER — AMBULATORY - HEALTHEAST (OUTPATIENT)
Dept: CARDIOLOGY | Facility: CLINIC | Age: 86
End: 2021-05-18

## 2021-05-19 ENCOUNTER — HOSPITAL ENCOUNTER (OUTPATIENT)
Dept: CARDIOLOGY | Facility: CLINIC | Age: 86
Discharge: HOME | End: 2021-05-19
Attending: INTERNAL MEDICINE
Payer: MEDICARE

## 2021-05-19 DIAGNOSIS — I35.0 SEVERE AORTIC STENOSIS: ICD-10-CM

## 2021-05-19 DIAGNOSIS — I50.30 (HFPEF) HEART FAILURE WITH PRESERVED EJECTION FRACTION (H): ICD-10-CM

## 2021-05-19 LAB
AORTIC ROOT: 3 CM
AORTIC VALVE MEAN VELOCITY: 119 CM/S
ASCENDING AORTA: 3.7 CM
AV DIMENSIONLESS INDEX VTI: 0.5
AV MEAN GRADIENT: 7 MMHG
AV PEAK GRADIENT: 11.4 MMHG
AV VALVE AREA: 1.6 CM2
AV VELOCITY RATIO: 0.5
BSA FOR ECHO PROCEDURE: 1.98 M2
CV BLOOD PRESSURE: ABNORMAL MMHG
CV ECHO HEIGHT: 65 IN
CV ECHO WEIGHT: 189 LBS
DOP CALC AO PEAK VEL: 169 CM/S
DOP CALC AO VTI: 34.1 CM
DOP CALC LVOT AREA: 3.14 CM2
DOP CALC LVOT DIAMETER: 2 CM
DOP CALC LVOT PEAK VEL: 81.3 CM/S
DOP CALC LVOT STROKE VOLUME: 54.6 CM3
DOP CALCLVOT PEAK VEL VTI: 17.4 CM
EJECTION FRACTION: 74 % (ref 55–75)
FRACTIONAL SHORTENING: 40.1 % (ref 28–44)
INTERVENTRICULAR SEPTUM IN END DIASTOLE: 0.97 CM (ref 0.6–0.9)
IVS/PW RATIO: 1
LA AREA 1: 19 CM2
LA AREA 2: 23 CM2
LEFT ATRIUM LENGTH: 5.5 CM
LEFT ATRIUM SIZE: 4.7 CM
LEFT ATRIUM TO AORTIC ROOT RATIO: 1.57 NO UNITS
LEFT ATRIUM VOLUME INDEX: 34.1 ML/M2
LEFT ATRIUM VOLUME: 67.5 ML
LEFT VENTRICLE CARDIAC INDEX: 2.3 L/MIN/M2
LEFT VENTRICLE CARDIAC OUTPUT: 4.5 L/MIN
LEFT VENTRICLE DIASTOLIC VOLUME INDEX: 29.1 CM3/M2 (ref 29–61)
LEFT VENTRICLE DIASTOLIC VOLUME: 57.6 CM3 (ref 46–106)
LEFT VENTRICLE HEART RATE: 82 BPM
LEFT VENTRICLE MASS INDEX: 70.1 G/M2
LEFT VENTRICLE SYSTOLIC VOLUME INDEX: 7.7 CM3/M2 (ref 8–24)
LEFT VENTRICLE SYSTOLIC VOLUME: 15.2 CM3 (ref 14–42)
LEFT VENTRICULAR INTERNAL DIMENSION IN DIASTOLE: 4.26 CM (ref 3.8–5.2)
LEFT VENTRICULAR INTERNAL DIMENSION IN SYSTOLE: 2.55 CM (ref 2.2–3.5)
LEFT VENTRICULAR MASS: 138.7 G
LEFT VENTRICULAR OUTFLOW TRACT MEAN GRADIENT: 1 MMHG
LEFT VENTRICULAR OUTFLOW TRACT MEAN VELOCITY: 54.6 CM/S
LEFT VENTRICULAR OUTFLOW TRACT PEAK GRADIENT: 3 MMHG
LEFT VENTRICULAR POSTERIOR WALL IN END DIASTOLE: 1.01 CM (ref 0.6–0.9)
LV STROKE VOLUME INDEX: 27.6 ML/M2
MITRAL VALVE E/A RATIO: 2.5
MV AVERAGE E/E' RATIO: 21.3 CM/S
MV DECELERATION TIME: 173 MS
MV E'TISSUE VEL-LAT: 7.45 CM/S
MV E'TISSUE VEL-MED: 6.38 CM/S
MV LATERAL E/E' RATIO: 19.7
MV MEAN GRADIENT: 3 MMHG
MV MEDIAL E/E' RATIO: 23
MV PEAK A VELOCITY: 59.7 CM/S
MV PEAK E VELOCITY: 147 CM/S
NUC REST DIASTOLIC VOLUME INDEX: 3024 LBS
NUC REST SYSTOLIC VOLUME INDEX: 65 IN
PV ACCELERATION TIME: 67 MS
PV ACCELERATION TIME: 67 MS
RAA: 22 CM2
TRICUSPID REGURGITATION PEAK PRESSURE GRADIENT: 42.8 MMHG
TRICUSPID VALVE PEAK REGURGITANT VELOCITY: 327 CM/S

## 2021-05-19 ASSESSMENT — MIFFLIN-ST. JEOR: SCORE: 1283.18

## 2021-05-20 ENCOUNTER — RECORDS - HEALTHEAST (OUTPATIENT)
Dept: ADMINISTRATIVE | Facility: OTHER | Age: 86
End: 2021-05-20

## 2021-05-20 ENCOUNTER — OFFICE VISIT - HEALTHEAST (OUTPATIENT)
Dept: CARDIOLOGY | Facility: CLINIC | Age: 86
End: 2021-05-20

## 2021-05-20 DIAGNOSIS — Z95.2 S/P TAVR (TRANSCATHETER AORTIC VALVE REPLACEMENT): ICD-10-CM

## 2021-05-20 LAB
ANION GAP SERPL CALCULATED.3IONS-SCNC: 11 MMOL/L (ref 5–18)
BUN SERPL-MCNC: 55 MG/DL (ref 8–28)
CALCIUM SERPL-MCNC: 9.4 MG/DL (ref 8.5–10.5)
CHLORIDE BLD-SCNC: 100 MMOL/L (ref 98–107)
CO2 SERPL-SCNC: 29 MMOL/L (ref 22–31)
CREAT SERPL-MCNC: 1.41 MG/DL (ref 0.6–1.1)
ERYTHROCYTE [DISTWIDTH] IN BLOOD BY AUTOMATED COUNT: 16 % (ref 11–14.5)
GFR SERPL CREATININE-BSD FRML MDRD: 35 ML/MIN/1.73M2
GLUCOSE BLD-MCNC: 118 MG/DL (ref 70–125)
HCT VFR BLD AUTO: 31.7 % (ref 35–47)
HGB BLD-MCNC: 9.6 G/DL (ref 12–16)
MCH RBC QN AUTO: 29.6 PG (ref 27–34)
MCHC RBC AUTO-ENTMCNC: 30.3 G/DL (ref 32–36)
MCV RBC AUTO: 98 FL (ref 80–100)
PLATELET # BLD AUTO: 178 THOU/UL (ref 140–440)
PMV BLD AUTO: 12.1 FL (ref 8.5–12.5)
POTASSIUM BLD-SCNC: 3.9 MMOL/L (ref 3.5–5)
RBC # BLD AUTO: 3.24 MILL/UL (ref 3.8–5.4)
SODIUM SERPL-SCNC: 140 MMOL/L (ref 136–145)
WBC: 9 THOU/UL (ref 4–11)

## 2021-05-20 ASSESSMENT — MIFFLIN-ST. JEOR: SCORE: 1264.57

## 2021-05-21 LAB
ATRIAL RATE - MUSE: 85 BPM
DIASTOLIC BLOOD PRESSURE - MUSE: NORMAL
INTERPRETATION ECG - MUSE: NORMAL
P AXIS - MUSE: NORMAL
PR INTERVAL - MUSE: NORMAL
QRS DURATION - MUSE: 80 MS
QT - MUSE: 396 MS
QTC - MUSE: 471 MS
R AXIS - MUSE: -36 DEGREES
SYSTOLIC BLOOD PRESSURE - MUSE: NORMAL
T AXIS - MUSE: -54 DEGREES
VENTRICULAR RATE- MUSE: 85 BPM

## 2021-05-23 ENCOUNTER — COMMUNICATION - HEALTHEAST (OUTPATIENT)
Dept: CARDIOLOGY | Facility: CLINIC | Age: 86
End: 2021-05-23
Payer: MEDICARE

## 2021-05-23 DIAGNOSIS — I50.32 CHRONIC HEART FAILURE WITH PRESERVED EJECTION FRACTION (H): ICD-10-CM

## 2021-05-27 ENCOUNTER — AMBULATORY - HEALTHEAST (OUTPATIENT)
Dept: CARDIAC REHAB | Facility: CLINIC | Age: 86
End: 2021-05-27

## 2021-05-27 VITALS — HEIGHT: 65 IN | WEIGHT: 189 LBS | BODY MASS INDEX: 31.49 KG/M2

## 2021-05-27 VITALS — WEIGHT: 186 LBS | HEIGHT: 65 IN | BODY MASS INDEX: 31.45 KG/M2 | BODY MASS INDEX: 30.95 KG/M2

## 2021-05-27 VITALS — HEART RATE: 84 BPM | DIASTOLIC BLOOD PRESSURE: 76 MMHG | RESPIRATION RATE: 16 BRPM | SYSTOLIC BLOOD PRESSURE: 122 MMHG

## 2021-05-27 DIAGNOSIS — Z95.2 S/P TAVR (TRANSCATHETER AORTIC VALVE REPLACEMENT): ICD-10-CM

## 2021-06-15 NOTE — PROGRESS NOTES
Valve Clinic Nursing Note: Aortic Stenosis    Referring provider: Colt    Patient history is significant for severe aortic stenosis, moderate MR, HTN, newer onset A fib (on Eliquis and Metoprolol), HTN, CKD, gout.      Echo information: ()  EF: 55% M-41 P. Rivera: 4.3 CRISTHIAN: 0.6 Di: 0.2 Svi: 30      Tentative Plan: Patient to have virtual valve clinic appt with Dr. Madrigal. Valve clinic to follow up once that is completed with plan and scheduling.     Current Outpatient Medications   Medication Sig     allopurinoL (ZYLOPRIM) 300 MG tablet Take 300 mg by mouth daily.     apixaban ANTICOAGULANT (ELIQUIS) 5 mg Tab tablet Take 1 tablet (5 mg total) by mouth 2 (two) times a day.     cholecalciferol, vitamin D3, 1,000 unit (25 mcg) tablet Take 1,000 Units by mouth daily.     furosemide (LASIX) 20 MG tablet Take 1 tablet (20 mg total) by mouth daily.     losartan (COZAAR) 50 MG tablet Take 50 mg by mouth 2 (two) times a day.     metoprolol tartrate (LOPRESSOR) 50 MG tablet Take 1 tablet (50 mg total) by mouth 2 (two) times a day.       Preliminary STS score: 5%      Noemi Dye, RN, BSN  Valve Clinic Coordinator  [facility]  [phone]  21 10:32 AM

## 2021-06-15 NOTE — TELEPHONE ENCOUNTER
Please see 2/23/21 hospitalization notes and recommendation for Valve Clinic.  Please let me know if I can be of any help.  Thank you.  Iesha  -------------------------------------------------  Inpatient seen at  during hospital course. Recommendation for TAVR called to my direct number. Dr. Gregory recommending patient be seen within the next couple weeks in Valve clinic.    Per Cardiology Progress Note 2/25/21::  1. Severe aortic stenosis.  Discussed with patient and daughter plans for outpatient evaluation for TAVR procedure.  Questions answered.  Advised limited activity until procedure completed.     Plan discharge to home today on furosemide 20, metoprolol 50 twice daily, Eliquis 5 twice daily, with TAVR clinic follow-up.

## 2021-06-15 NOTE — PROGRESS NOTES
ROS:  Shortness of breath with activity, irregular heartbeat, leg swelling, gets light headed after taking morning pills.      Send doxy me link to this email:      [email or web address]

## 2021-06-15 NOTE — TELEPHONE ENCOUNTER
Margie (daughter) calls and says that her mother cannot lay down because she gets very short of breath. Margie says that pt. gets winded walking to the bedroom. Margie says that her mother is waiting to have a valve replacement, also. Margie says that her mother's breathing was not like this yesterday. Margie will take pt. To the [facility] ER now. Maine Varghese RN FNA  COVID 19 Nurse Triage Plan/Patient Instructions    Please be aware that novel coronavirus (COVID-19) may be circulating in the community. If you develop symptoms such as fever, cough, or SOB or if you have concerns about the presence of another infection including coronavirus (COVID-19), please contact your health care provider or visit  [email or web address].    Disposition/Instructions    ED Visit recommended. Follow protocol based instructions.      Bring Your Own Device:  Please also bring your smart device(s) (smart phones, tablets, laptops) and their charging cables for your personal use and to communicate with your care team during your visit.      Thank you for taking steps to prevent the spread of this virus.  o Limit your contact with others.  o Wear a simple mask to cover your cough.  o Wash your hands well and often.    Resources    [facility]: About COVID-19: [email or web address]    CDC: What to Do If You're Sick: www.cdc.gov/coronavirus/2019-ncov/about/steps-when-sick.html    CDC: Ending Home Isolation: www.cdc.gov/coronavirus/2019-ncov/hcp/disposition-in-home-patients.html     CDC: Caring for Someone: www.cdc.gov/coronavirus/2019-ncov/if-you-are-sick/care-for-someone.html     [facility]: Interim Guidance for Hospital Discharge to Home: www.health.[facility].[place].us/diseases/coronavirus/hcp/hospdischarge.pdf    [facility] clinical trials (COVID-19 research studies): clinicalaffairs.[facility]/n-clinical-trials     Below are the COVID-19 hotlines at the [place] Department of Health ([facility]).  Interpreters are available.   o For health questions: Call 623-022-3322 or 1-999.193.5823 (7 a.m. to 7 p.m.)  o For questions about schools and childcare: Call 892-784-3584 or 1-838.432.9285 (7 a.m. to 7 p.m.)     Reason for Disposition    [1] MODERATE difficulty breathing (e.g., speaks in phrases, SOB even at rest, pulse 100-120) AND [2] NEW-onset or WORSE than normal    Additional Information    Negative: [1] Breathing stopped AND [2] hasn't returned    Negative: Choking on something    Negative: Severe difficulty breathing (e.g., struggling for each breath, speaks in single words)    Negative: Bluish (or gray) lips or face now    Negative: Difficult to awaken or acting confused (e.g., disoriented, slurred speech)    Negative: Passed out (i.e., lost consciousness, collapsed and was not responding)    Negative: Wheezing started suddenly after medicine, an allergic food or bee sting    Negative: Stridor    Negative: Slow, shallow and weak breathing    Negative: Sounds like a life-threatening emergency to the triager    Negative: Chest pain    Negative: [1] Wheezing (high pitched whistling sound) AND [2] previous asthma attacks or use of asthma medicines    Negative: [1] Difficulty breathing AND [2] only present when coughing    Negative: [1] Difficulty breathing AND [2] only from stuffy or runny nose    Protocols used: BREATHING DIFFICULTY-A-AH

## 2021-06-16 NOTE — TELEPHONE ENCOUNTER
Patients daughter Sherrell calling to give valve team a heads up on patients dental work.    She needs a cavity and crown replaced.  Those appointments are on 3/25 and 4/12  She also has to have an appointment with and endodontist to consult for a root issue. Sherrell is unsure if this will lead to more work or not.  They have to have the consultation first.  She will either call me with the information from the endodontist or [facility] to give an [facility] so we are aware of timeline.    If there is no work to be done at the endodontist, she is hopeful that all her dental work will be done 4/12.

## 2021-06-16 NOTE — PROGRESS NOTES
Review of Systems - History obtained from daughter and the patient  General ROS: negative  Psychological ROS: negative  Ophthalmic ROS: negative  ENT ROS: negative  Allergy and Immunology ROS: negative  Hematological and Lymphatic ROS: negative  Endocrine ROS: negative  Respiratory ROS: negative  Cardiovascular ROS: negative  Gastrointestinal ROS: negative  Genito-Urinary ROS: negative  Musculoskeletal ROS: negative  Neurological ROS: negative  Dermatological ROS: negative

## 2021-06-16 NOTE — TELEPHONE ENCOUNTER
Pts daughter Consuelo called with concerns of dizziness the pt has been experiencing since this morning at about 11am. Pt is s/p TAVR on 4/20. Pt was discharged yesterday and home by 6pm. Pt has been up moving around without difficulty and eating/drinking well. Pt woke up at about 9am, took her morning medications at about 945am and at 11am the dizziness with hot flashes started. Consuelo states the dizziness with hot flashes seems to be coming in waves but has been consistent. They got pt back into bed with no relief in symptoms. Pt did take her metoprolol and losartan this morning. They only checked one BP and /63 and HR 75. Pt denies any other symptoms. Will forward message to Reshma for review. Patient's daughter verbalizes understanding and agrees with plan. No further questions or concerns at this time.

## 2021-06-16 NOTE — TELEPHONE ENCOUNTER
Attempted to call patient's daughter- LM for her to return my call.    Noemi Dye, RN, BSN  Valve Clinic Coordinator  [facility]  [phone]  04/12/21 2:17 PM

## 2021-06-16 NOTE — TELEPHONE ENCOUNTER
ACURATE AMEENA Study Recruitment Phone Call     To evaluate safety and effectiveness of the ACURATE Transfemoral Aortic Valve System for transcatheter aortic valve replacement (TAVR) in subjects with severe native aortic stenosis who are indicated for TAVR.    The patients son, Clemente, and I have discussed the ACURATE Study today. His questions were answered to his satisfaction. After discussing with Dr. Madrigal and his mom Radha, they have declined to participate in the ACURATE Study.    Clemente had some additional questions about surgical bailout options for the TAVR procedure. I have forwarded his questions to the TAVR coordinator to have surgery reach out to Clemente to discuss.

## 2021-06-16 NOTE — TELEPHONE ENCOUNTER
----- Message from Radha Perez sent at 4/12/2021  9:58 AM CDT -----  Hey,  Patients daughter Erica is calling me this morning, wondering when we would be doing her TAVR procedure.  I see that Lizzie reached out to the patient last week, but her daughter is now calling.    Can someone give her a call?    [phone]    Thanks!  Radha

## 2021-06-16 NOTE — TELEPHONE ENCOUNTER
I spoke to patient's daughter Sherrell. They would like to move forward with scheduling her TAVR on 4/20. I will place order for TAVR in [facility].    Noemi Dye, RN, BSN  Valve Clinic Coordinator  [facility]  [phone]  04/14/21 8:58 AM

## 2021-06-16 NOTE — TELEPHONE ENCOUNTER
Erica is contacted with the recommendations to increase the Lasix dose up to 40mg two times a day for 2 days then to return to 20mg two times a day thereafter.As per Karol Flores CNP.  She remains fever free and has felt better with the use of the Coricidin.  She will call back to clinic if symptoms persists or if they are progressing. sk/RN

## 2021-06-16 NOTE — TELEPHONE ENCOUNTER
Patient confirmed for TAVR on Tuesday 4/20- informed dtr Sherrell to have patient hold her Eliquis starting today. She had no further questions and we will see them for pre-op on Monday.    Noemi Dye, RN, BSN  Valve Clinic Coordinator  [facility]  [phone]  04/16/21 9:26 AM

## 2021-06-16 NOTE — TELEPHONE ENCOUNTER
ACURATE AMEENA Study Recruitment Phone Call    To evaluate safety and effectiveness of the ACURATE Transfemoral Aortic Valve System for transcatheter aortic valve replacement (TAVR) in subjects with severe native aortic stenosis who are indicated for TAVR.    Radha Orona was called by phone 04/09/21 to discuss participation in the ACURATE AMEENA Study. she was provided with a brief overview of the study and the study device, including randomization. She is very on the fence about study participation and is quite anxious about potentially receiving an investigational device. She is going to discuss the study with her family this weekend. She has my cell phone number to call with any questions. I will reach out on Monday to see if she is interested in pursuing participation.    Lizzie Kim

## 2021-06-16 NOTE — PROGRESS NOTES
Patient in to see RN for Pre-TAVR visit on 4/19/2021     All pre-procedure labs drawn: 4/19/2021    EKG obtained: 4/19/2021      MRSA nose swab collected: 4/19/2021     5 meter walk: 4/19/2021   9.62, 9.85, 10.30      STS score: 5%  NYHA score: 3  CCS score: 0    Labs reviewed: yes, no actions needed    Patient instructed on medications:   Continue to hold Eliquis the morning of your procedure  Hold all other meds that morning    Patient on blood thinner: Eliquis    Loading dose of ASA: 325 MG upon arrival to [facility]    Sent home with showering instructions.        Education was given to patient regarding what to expect pre-procedure.     Instructed to come to the main entrance of [facility] at 8 am    TAVR and [place] consent signed at the time of the appt: yes, sent to 3C    All questions were answered to family and patient by RN.    Patient and daughter present at the time of appointment.      Noemi Dye RN, BSN  Valve Clinic Coordinator  [facility]  [phone]  04/19/21 11:37 AM

## 2021-06-16 NOTE — TELEPHONE ENCOUNTER
We were notified by the heart failure clinic nurses that they received a call from daughter Sherrell stating that the patient had a syncopal episode and patient is going to [facility] via EMS.    Dtr called [facility] line. She states that the patient arrested in the ER. She is requesting for Dr. Madrigal to call and speak with ER physician. She is also requesting that we fax the advanced directive to [employer]. This was faxed to [phone]    Dr Madrigal called and spoke with ER physician [phone]. Plan for PPM at [facility]. Dr. Madrigal will also call and update daughter.      Parker Xavier RN  [facility]  Phone: [phone]  Fax: [phone]  04/22/21  3:39 PM

## 2021-06-16 NOTE — TELEPHONE ENCOUNTER
Radha and her daughter Erica are calling today to discuss her concerns. She was recently seen last week, at which time she was feeling well. Over the past two days she has developed a lot of nasal congestion, a cough and is feeling increased fatigue.    She has a two pound wt gain from yesterday. Significant ankle swelling bilaterally noted. She is working on her oral fluid intake. She measured 40oz yesterday that she was able to fit in.  She was encouraged to increase this to 50oz -60 oz per day she will try.    With review of her medications, she does not have anything listed causing her a cough. She was likely having a sinus issue, as they did offer her Coricidin which did help her symptoms.    She currently uses Lasix 20mg two times a day.     Karol Flores CNP what are your recommendations at this time? sk/RN

## 2021-06-16 NOTE — PROGRESS NOTES
"Radha Orona is a 89 y.o. female who is being evaluated via a billable telephone visit.      The patient has been notified of following:     \"This telephone visit will be conducted via a call between you and your physician/provider. We have found that certain health care needs can be provided without the need for a physical exam.  This service lets us provide the care you need with a short phone conversation.  If a prescription is necessary we can send it directly to your pharmacy.  If lab work is needed we can place an order for that and you can then stop by our lab to have the test done at a later time.    Telephone visits are billed at different rates depending on your insurance coverage. During this emergency period, for some insurers they may be billed the same as an in-person visit.  Please reach out to your insurance provider with any questions.    If during the course of the call the physician/provider feels a telephone visit is not appropriate, you will not be charged for this service.\"    Patient has given verbal consent to a Telephone visit? Yes    What phone number would you like to be contacted at? [phone]    Patient would like to receive their AVS by AVS Preference: [facility]art.    Phone call duration: 13 minutes  Start time: 2:01 PM  End time: 2:14    Edu Powers MD              Cardiothoracic Surgery Consult    Date of Service: 4/5/2021    REFERRING CARDIOLOGIST: Aubrie Prieto MD and Zackery Madrigal MD    REASON FOR CONSULTATION: Severe, symptomatic aortic valve stenosis     HISTORY OF PRESENT ILLNESS: Ms. Radha Orona is a 89 y.o. year-old female with chronic atrial fibrillation, CKD stage 3, HTN, and chronic diastolic heart failure was admitted with a CHF exacerbation. She has dyspnea on exertion and on work up was found to have aortic valve stenosis. She is currently undergoing evaluation for a transcatheter replacement.        PAST MEDICAL HISTORY:   Past Medical History:   Diagnosis " Date     Allergic conjunctivitis of both eyes      Atrial fibrillation (H)      Chronic gouty arthritis      Chronic kidney disease due to hypertension      Chronic kidney disease, stage 3a      Class 1 obesity      Environmental allergies      Hyperlipidemia      Vitamin D deficiency        PAST SURGICAL HISTORY:   Past Surgical History:   Procedure Laterality Date     BILATERAL OOPHORECTOMY Bilateral     30 years ago per patient report     CV CORONARY ANGIOGRAM N/A 3/29/2021    Procedure: Coronary Angiogram;  Surgeon: Zackery Madrigal MD;  Location: [facility] Cardiac Cath Lab;  Service: Cardiology     VARICOSE VEIN SURGERY         ALLERGIES:   Allergies   Allergen Reactions     Celecoxib      hives     Sulfa (Sulfonamide Antibiotics)      rash          CURRENT MEDICATIONS:  Current Outpatient Medications on File Prior to Visit   Medication Sig Dispense Refill     allopurinoL (ZYLOPRIM) 300 MG tablet Take 300 mg by mouth daily.       apixaban ANTICOAGULANT (ELIQUIS) 5 mg Tab tablet Take 1 tablet (5 mg total) by mouth 2 (two) times a day. 60 tablet 0     cetirizine (ZYRTEC) 10 MG tablet Take 10 mg by mouth daily.       cholecalciferol, vitamin D3, 1,000 unit (25 mcg) tablet Take 1,000 Units by mouth daily.       furosemide (LASIX) 20 MG tablet Take 1 tablet (20 mg total) by mouth 2 (two) times a day at 9am and 6pm. 60 tablet 0     losartan (COZAAR) 25 MG tablet Take 1 tablet (25 mg total) by mouth daily. 30 tablet 0     metoprolol tartrate (LOPRESSOR) 50 MG tablet Take 1 tablet (50 mg total) by mouth 2 (two) times a day. 60 tablet 0     No current facility-administered medications on file prior to visit.          FAMILY HISTORY:   Family History   Problem Relation Age of Onset     Heart disease Father      Heart disease Brother      The family history was reviewed and is not pertinent to the patient's disease/illness    SOCIAL HISTORY:   Social History     Socioeconomic History     Marital status:       Spouse name: Not on file     Number of children: Not on file     Years of education: Not on file     Highest education level: Not on file   Occupational History     Not on file   Social Needs     Financial resource strain: Not on file     Food insecurity     Worry: Not on file     Inability: Not on file     Transportation needs     Medical: Not on file     Non-medical: Not on file   Tobacco Use     Smoking status: Never Smoker     Smokeless tobacco: Never Used   Substance and Sexual Activity     Alcohol use: Not Currently     Frequency: Monthly or less     Drinks per session: 1 or 2     Drug use: Never     Sexual activity: Not on file   Lifestyle     Physical activity     Days per week: Not on file     Minutes per session: Not on file     Stress: Not on file   Relationships     Social connections     Talks on phone: Not on file     Gets together: Not on file     Attends Yazidi service: Not on file     Active member of club or organization: Not on file     Attends meetings of clubs or organizations: Not on file     Relationship status: Not on file     Intimate partner violence     Fear of current or ex partner: Not on file     Emotionally abused: Not on file     Physically abused: Not on file     Forced sexual activity: Not on file   Other Topics Concern     Not on file   Social History Narrative     Not on file       REVIEW OF SYSTEMS:  A complete 10 point review of systems was obtained and is negative other than the above stated complaints    PHYSICAL EXAMINATION:   No vitals or physical for this telephone visit.       LABORATORY STUDIES:   Lab Results   Component Value Date    WBC 7.9 03/29/2021    HGB 10.7 (L) 03/29/2021    HCT 34.6 (L) 03/29/2021    MCV 94 03/29/2021     03/29/2021      Lab Results   Component Value Date    CREATININE 1.55 (H) 03/29/2021    BUN 51 (H) 03/29/2021     03/29/2021    K 4.3 03/29/2021     03/29/2021    CO2 28 03/29/2021     No results found for: HGBA1C  EKG:  atrial fibrillation, ventricular rate 68.     CARDIAC CATHETERIZATION: This study was personally reviewed by me  Non-obstructive    TRANSTHORACIC ECHOCARDIOGRAM: This study was personally reviewed by me  LVEF 55%, severe aortic stenosis, CRISTHIAN 0.6 cm2, MG 38-41 mmHg, PV 4.3 m/s, trace insufficiency, mild to moderate mitral insufficiency, moderate bi atrial enlargement.     IMPRESSION AND PLAN: Ms. Radha Orona is a 89 y.o. Year-old female with severe, symptomatic aortic stenosis. Echocardiography demonstrates preserved function with an LVEF of 55% , and coronary angiography demonstrates non-obstructive disease. I recommend aortic valve replacement. I discussed the technical details of the open surgical AVR with the patient, as well as the expected postoperative course and recovery. The risks include but are not limited to bleeding, infection, stroke, heart or graft failure, dysrhythmia, respiratory failure, kidney or liver injury, bowel or limb ischemia, and death. She is a moderate risk surgical candidate due to age and renal failure. Because of that and patient preference, I recommend transcatheter aortic valve replacement. I discussed the technical details of this with her as well. She understands that there is a risk of bleeding, infection, stroke, heart block requiring permanent pacemaker, cardiac perforation, aortic root rupture and aortic dissection, in addition to risk of death. She wishes to proceed with scheduling for TAVR. We also discussed possible need for emergent conversion to open sternotomy surgery in the event of complications.     - TAVR scheduling per valve clinic  - CPB standby: Undecided. Will inform team prior to surgery. My recommendation is no CPB standby given high risk of mortality and significant morbidity with emergency surgery.     Thank you very much for this referral.       Edu Powers 4/5/2021 1:51 PM

## 2021-06-16 NOTE — PATIENT INSTRUCTIONS - HE
Radha Orona,    It was a pleasure to see you today at the [facility].     My recommendations after this visit include:  - No changes to your medications.    - Limit salt in your diet.   - Continue to monitor for signs of retaining fluid (increasing weights, shortness of breath, swelling) and call with any concerns  - If you have any questions or concerns, please call [phone] to talk with my nurse.    Karol Flores, CNP

## 2021-06-16 NOTE — TELEPHONE ENCOUNTER
Prior to nurse writing this chart message, I had a conversation with said nurse and told her I would review medications at discharge from [facility].  Yesterday I talked with the discharging IRAM, who stated that metoprolol would be started.  I suggested PEPITO at discharge, but not sure if a PEPITO was placed.     Since the writing of the original message, patient's daughter called back and reported that Radha had 2 syncopal episodes.  They called 911 and patient being admitted to [facility] because there are no beds at [facility].   Will follow up.     Reshma Silva PA-C, MPAS  Structural Heart Program  [facility]

## 2021-06-16 NOTE — TELEPHONE ENCOUNTER
I spoke to patient's daughter Sherrell and we discussed the plan. Sounds like all of her dental work is completed and she is ready for her TAVR.  I would like to put her on the schedule for 4/20 but am waiting for the okay from the [facility] since their cath lab room is down.    I will update Sherrell as soon as I hear something and we can schedule her TAVR.    Noemi Dye, RN, BSN  Valve Clinic Coordinator  [facility]  [phone]  04/12/21 2:42 PM

## 2021-06-16 NOTE — TELEPHONE ENCOUNTER
She had minimal edema last week and weight was stable.  Increase lasix to 40 mg twice daily for 2 days and then return to normal dose. It sounds like she has something else going on with nasal congestion, cough, and fatigue.  She should see primary if this does not improve in the next few days.  Does she have a fever or other covid symptoms?

## 2021-06-16 NOTE — PROGRESS NOTES
Radha presents for pre/post CT IVF. She tolerated infusions without issue, and went to CT after 2 hours of IVF, followed by 2 hours addtl IVF. Upon completion, IV d'cd and she was escorted to the lobby via w/c. Noemi Will

## 2021-06-17 NOTE — PROGRESS NOTES
[facility] Heart Care Note    Assessment:    Radha Orona is a 89 y.o. old female with severe aortic stenosis s/p TAVR '2q, moderate MR, HTN, newer onset A fib (on Eliquis and Metoprolol), HL, CKD, gout who is here for f/u after TAVR.           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 Radha Orona is a 89 y.o. female with a known history of  severe aortic stenosis s/p TAVR '2q, moderate MR, HTN, newer onset A fib (on Eliquis and Metoprolol), HL, CKD, gout who is here for f/u after TAVR.    Her post-TAVR course was c/b CHB, ultimately requiring PPM placement at [facility]. Otherwise, she is doing better in terms of shortness of breath/TAVAREZ, no syncope, no CP/orthpea/PND.Does have chronic pedal edema, which has improved somewhat with torsemide switch from furosemide.   ______________________________________________________________________      Review of Systems:   As noted in HPI, all others reviewed and are negative      Problem List:  Patient Active Problem List   Diagnosis     Chronic gouty arthritis     Stage 3a chronic kidney disease     Class 1 obesity     Environmental allergies     Hyperlipidemia     Vitamin D deficiency     Atrial fibrillation (H)     Severe aortic valve stenosis     Dyspnea     Essential hypertension     Severe aortic stenosis     (HFpEF) heart failure with preserved ejection fraction (H)     S/P TAVR (transcatheter aortic valve replacement)     Medical  History:  Past Medical History:   Diagnosis Date     Allergic conjunctivitis of both eyes      Atrial fibrillation (H)      Chronic gouty arthritis      Chronic kidney disease due to hypertension      Chronic kidney disease, stage 3a      Class 1 obesity      Environmental allergies      Hyperlipidemia      Vitamin D deficiency      Surgical History:  Past Surgical History:   Procedure Laterality Date     BILATERAL OOPHORECTOMY Bilateral     30 years ago per patient report     CV CORONARY ANGIOGRAM N/A 3/29/2021    Procedure: Coronary Angiogram;  Surgeon: Zackery Madrigal MD;  Location: [facility] Cardiac Cath Lab;  Service: Cardiology     VARICOSE VEIN SURGERY       Social History:  Social History     Socioeconomic History     Marital status:      Spouse name: Not on file     Number of children: Not on file     Years of education: Not on file     Highest education level: Not on file   Occupational History     Not on file   Social Needs     Financial resource strain: Not on file     Food insecurity     Worry: Not on file     Inability: Not on file     Transportation needs     Medical: Not on file     Non-medical: Not on file   Tobacco Use     Smoking status: Never Smoker     Smokeless tobacco: Never Used   Substance and Sexual Activity     Alcohol use: Not Currently     Frequency: Monthly or less     Drinks per session: 1 or 2     Drug use: Never     Sexual activity: Not on file   Lifestyle     Physical activity     Days per week: Not on file     Minutes per session: Not on file     Stress: Not on file   Relationships     Social connections     Talks on phone: Not on file     Gets together: Not on file     Attends [employer] service: Not on file     Active member of club or organization: Not on file     Attends meetings of clubs or organizations: Not on file     Relationship status: Not on file     Intimate partner violence     Fear of current or ex partner: Not on file     Emotionally abused: Not on file      "Physically abused: Not on file     Forced sexual activity: Not on file   Other Topics Concern     Not on file   Social History Narrative     Not on file           Family History:  Family History   Problem Relation Age of Onset     Heart disease Father      Heart disease Brother          Allergies:  Allergies   Allergen Reactions     Celecoxib      hives     Sulfa (Sulfonamide Antibiotics)      rash       Medications:  Current Outpatient Medications   Medication Sig Dispense Refill     allopurinoL (ZYLOPRIM) 300 MG tablet Take 300 mg by mouth daily.       apixaban ANTICOAGULANT (ELIQUIS) 5 mg Tab tablet Take 1 tablet (5 mg total) by mouth 2 (two) times a day. 60 tablet 3     aspirin (ENTERIC COATED ASPIRIN) 81 MG EC tablet Take 1 tablet by mouth daily.       cetirizine (ZYRTEC) 10 MG tablet Take 10 mg by mouth daily.       cholecalciferol, vitamin D3, 1,000 unit (25 mcg) tablet Take 1,000 Units by mouth daily.       losartan (COZAAR) 25 MG tablet Take 1 tablet (25 mg total) by mouth daily. 30 tablet 0     metoprolol tartrate (LOPRESSOR) 50 MG tablet Take 1 tablet (50 mg total) by mouth 2 (two) times a day. 60 tablet 11     senna-docusate (PERICOLACE) 8.6-50 mg tablet Take 1 tablet by mouth daily as needed.       torsemide (DEMADEX) 20 MG tablet TAKE 1 TABLET(20 MG) BY MOUTH DAILY 30 tablet 0     No current facility-administered medications for this visit.        Objective:   Vital signs:  /76 (Patient Site: Right Arm, Patient Position: Sitting, Cuff Size: Adult Regular)   Pulse 84   Resp 16   Ht 5' 5\" (1.651 m)   Wt 186 lb (84.4 kg)   BMI 30.95 kg/m        Physical Exam:    GENERAL APPEARANCE: Alert, cooperative and in no acute distress.   HEENT: No scleral icterus. Oral mucuos membranes pink and moist.   NECK: JVP 6. No Hepatojugular reflux. Thyroid not visualized. No lymphadenopathy   CHEST: clear to auscultation   CARDIOVASCULAR: S1, S2 without murmur ,clicks or rubs. Brachial, radial and posterior " tibial pulses are intact and symetric. No carotid bruits noted. No edema  ABDOMEN: Nontender. BS+. No bruits.   SKIN: No Xanthelasma   Musculoskeletal: No cyanosis, clubbing or swelling.      Lab Results:  LIPIDS:  Lab Results   Component Value Date    CHOL 255 (H) 08/11/2020    CHOL 243 (H) 01/23/2019    CHOL 250 (H) 01/29/2018     Lab Results   Component Value Date    HDL 52 08/11/2020    HDL 55 01/23/2019    HDL 59 01/29/2018     Lab Results   Component Value Date    LDLCALC 180 (H) 08/11/2020    LDLCALC 164 (H) 01/23/2019    LDLCALC 169 (H) 01/29/2018     Lab Results   Component Value Date    TRIG 115 08/11/2020    TRIG 119 01/23/2019    TRIG 112 01/29/2018     No components found for: CHOLHDL    BMP:  Lab Results   Component Value Date    CREATININE 1.15 (H) 05/11/2021    BUN 44 (H) 05/11/2021     05/11/2021    K 4.1 05/11/2021    CL 97 (L) 05/11/2021    CO2 28 05/11/2021         [facility]

## 2021-06-17 NOTE — TELEPHONE ENCOUNTER
Patient's daughter Sherrell called today to check in after the weekend.    She reports that her mother's right knee is very painful and swollen.   Sherrell did report that patient has been going to the bathroom a lot over the weekend. I asked about her weights and she is going to ask the patient and call me back.     I will await her return call.    Noemi Dye, RN, BSN  Valve Clinic Coordinator  [facility]  [phone]  05/10/21 8:50 AM

## 2021-06-17 NOTE — TELEPHONE ENCOUNTER
----- Message from Maine Aldrich RN sent at 5/7/2021  8:12 AM CDT -----    ----- Message -----  From: Reshma Silva PA-C  Sent: 5/6/2021  12:21 PM CDT  To: Maine Aldrich RN    Saw Radha today.  Family was a bit upset with discharge on POD #1, then needing PPM the next day.  She required CPR in ambulance on way to [place].    Radha feels fine now and is improving everyday.  I changed lasix to torsemide today since LE edema not improved.  Maine - can you call her early next week and see how the torsemide is going?  Then when Dr. Madrigal sees her in 2 weeks, we can re-evaluate dose/regimen and check BMP to make sure creat stable.     Thanks,  Reshma

## 2021-06-17 NOTE — TELEPHONE ENCOUNTER
Sherrell called me back- her mom's weight is going up. She reports that she ate a mothers day meal yesterday so she may have had more sodium than normal.   She states that her weight is going up- on the 5/6 it was 165, 5/9 it was 186.8 and today it is 187.2. She reports a significant amount of pain in her right knee, along with swelling. They state that her right leg swelling has decreased today but both legs remain swollen.   Reshma would like an arterial ultrasound to check her right leg since she had a femoral TAVR.  She would also like to check a BMP to see if her kidney function has decreased.   I gave them the number to schedule both things, we will wait for those results.    Noemi Dye, RN, BSN  Valve Clinic Coordinator  [facility]  [phone]  05/10/21 9:51 AM

## 2021-06-17 NOTE — TELEPHONE ENCOUNTER
----- Message from Reshma Silva PA-C sent at 5/11/2021 12:44 PM CDT -----  Her kidneys like the torsemide.  Would continue it.  How is her LE edema?  Better?  When I saw her she was having a hard time putting her shoes on. If weight stable or increased, could try changing it to 10 mg in AM and 10 mg in PM instead of 20 mg daily    Reshma Silva PA-C, [facility]  Structural Heart Program  [facility] Heart [facility]

## 2021-06-17 NOTE — TELEPHONE ENCOUNTER
I called and spoke to Radha. She states that today is much better. Her leg swelling has improved and her weight is down to 185 from 187 yesterday. She reports feeling good and notices a difference today. She has been urinating more with torsemide. She denies any shortness of breath.    It appears that her leg ultrasound seems to be fine, from the preliminary results. If SHERRY Justice has anything to add regarding that, I can call her back.    She has no other concerns at this time and plans to follow up with Dr. Madrigal in a few weeks.    Noemi Dye, RN, BSN  Valve Clinic Coordinator  [facility] Structural Heart Clinic  [phone]  05/11/21 2:12 PM

## 2021-06-17 NOTE — TELEPHONE ENCOUNTER
----- Message from Reshma Silva PA-C sent at 5/3/2021  2:13 PM CDT -----  Apparently patient was called by Sheng for her post-discharge phone call and asked why she was started on ASA when she is already on Eliquis.     Can you call her back to explain that that's our protocol?  If you don't have a chance, she sees me Thursday and I can talk to her about it then     Thanks,  Reshma

## 2021-06-17 NOTE — PATIENT INSTRUCTIONS - HE
It is so great to see you all today!    I'm glad to see how well you have done.  Let's hold the course with current meds  Please, follow up in lymphedema clinic - in addition to torsemide, they may have ways to help your leg swelling    Follow up in 1 year or as needed

## 2021-06-17 NOTE — TELEPHONE ENCOUNTER
Called and reiterated to the patient to continue her 81 mg a aspirin.    She states understanding and is doing quite well at home.    Noemi Dye, RN, BSN  Valve Clinic Coordinator  [facility]  [phone]  05/03/21 4:26 PM

## 2021-06-18 NOTE — PATIENT INSTRUCTIONS - HE
Patient Instructions by Reshma Silva PA-C at 4/19/2021 11:10 AM     Author: Reshma Silva PA-C Service: -- Author Type: Physician Assistant    Filed: 4/19/2021 12:24 PM Encounter Date: 4/19/2021 Status: Signed    : Reshma Silva PA-C (Physician Assistant)       Radha Orona,    It was a pleasure to see you today in the clinic regarding your upcoming TAVR.     My recommendations after this visit include:     - report to the [facility] tomorrow morning at the instructed time      If you have questions or concerns, please call using the numbers below:    Valve Clinic Pool  [phone]    After Hours/Scheduling  [phone]    Otherwise you can dial the nurse directly at:    Noemi Kirk RN  Valve clinic coordinator  [phone]

## 2021-06-18 NOTE — PATIENT INSTRUCTIONS - HE
Patient Instructions by Reshma Silva PA-C at 5/6/2021  9:50 AM     Author: Reshma Silva PA-C Service: -- Author Type: Physician Assistant    Filed: 5/6/2021 10:52 AM Encounter Date: 5/6/2021 Status: Signed    : Reshma Silva PA-C (Physician Assistant)       Radha Orona,    It was a pleasure to see you today in the clinic regarding your recent TAVR.     My recommendations after this visit include:     - stop the lasix after tonight's dose  - start taking torsemide 20 mg daily tomorrow morning  - okay to resume all activities as tolerated at this time  - okay to use biofreeze on groin muscle pull    You should followup with Dr. Madrigal on May 20 with labs (to check kidney function)      If you have questions or concerns, please call using the numbers below:    Valve Clinic Pool  [phone]    After Hours/Scheduling  [phone]    Otherwise you can dial the nurse directly at:    RONA Barboza   [phone]

## 2021-06-21 NOTE — LETTER
"Letter by Edu Powers MD at      Author: Edu Powers MD Service: -- Author Type: --    Filed:  Encounter Date: 4/5/2021 Status: (Other)         [facility] VALVE CLINIC POOL                                  April 5, 2021    Patient: Radha Orona   MR Number: [id number]   YOB: 1931   Date of Visit: 4/5/2021     Dear Dr. Meyer:    Thank you for referring Radha Orona to me for evaluation. Below are the relevant portions of my assessment and plan of care.    If you have questions, please do not hesitate to call me. I look forward to following Radha along with you.    Sincerely,        Edu Powers MD          CC  No Recipients  Edu Powers MD  4/5/2021  2:16 PM  Incomplete  Radha Orona is a 89 y.o. female who is being evaluated via a billable telephone visit.      The patient has been notified of following:     \"This telephone visit will be conducted via a call between you and your physician/provider. We have found that certain health care needs can be provided without the need for a physical exam.  This service lets us provide the care you need with a short phone conversation.  If a prescription is necessary we can send it directly to your pharmacy.  If lab work is needed we can place an order for that and you can then stop by our lab to have the test done at a later time.    Telephone visits are billed at different rates depending on your insurance coverage. During this emergency period, for some insurers they may be billed the same as an in-person visit.  Please reach out to your insurance provider with any questions.    If during the course of the call the physician/provider feels a telephone visit is not appropriate, you will not be charged for this service.\"    Patient has given verbal consent to a Telephone visit? Yes    What phone number would you like to be contacted at? [phone]    Patient would like to receive their AVS by AVS Preference: " Floresitacandida.    Phone call duration: 13 minutes  Start time: 2:01 PM  End time: 2:14    Edu Powers MD              Cardiothoracic Surgery Consult    Date of Service: 4/5/2021    REFERRING CARDIOLOGIST: Aubrie Prieto MD and Zackery Madrigal MD    REASON FOR CONSULTATION: Severe, symptomatic aortic valve stenosis     HISTORY OF PRESENT ILLNESS: Ms. Radha Orona is a 89 y.o. year-old female with chronic atrial fibrillation, CKD stage 3, HTN, and chronic diastolic heart failure was admitted with a CHF exacerbation. She has dyspnea on exertion and on work up was found to have aortic valve stenosis. She is currently undergoing evaluation for a transcatheter replacement.        PAST MEDICAL HISTORY:   Past Medical History:   Diagnosis Date   ? Allergic conjunctivitis of both eyes    ? Atrial fibrillation (H)    ? Chronic gouty arthritis    ? Chronic kidney disease due to hypertension    ? Chronic kidney disease, stage 3a    ? Class 1 obesity    ? Environmental allergies    ? Hyperlipidemia    ? Vitamin D deficiency        PAST SURGICAL HISTORY:   Past Surgical History:   Procedure Laterality Date   ? BILATERAL OOPHORECTOMY Bilateral     30 years ago per patient report   ? CV CORONARY ANGIOGRAM N/A 3/29/2021    Procedure: Coronary Angiogram;  Surgeon: Zackery Madrigal MD;  Location: [facility] Cardiac Cath Lab;  Service: Cardiology   ? VARICOSE VEIN SURGERY         ALLERGIES:   Allergies   Allergen Reactions   ? Celecoxib      hives   ? Sulfa (Sulfonamide Antibiotics)      rash          CURRENT MEDICATIONS:  Current Outpatient Medications on File Prior to Visit   Medication Sig Dispense Refill   ? allopurinoL (ZYLOPRIM) 300 MG tablet Take 300 mg by mouth daily.     ? apixaban ANTICOAGULANT (ELIQUIS) 5 mg Tab tablet Take 1 tablet (5 mg total) by mouth 2 (two) times a day. 60 tablet 0   ? cetirizine (ZYRTEC) 10 MG tablet Take 10 mg by mouth daily.     ? cholecalciferol, vitamin D3, 1,000 unit (25 mcg) tablet Take  1,000 Units by mouth daily.     ? furosemide (LASIX) 20 MG tablet Take 1 tablet (20 mg total) by mouth 2 (two) times a day at 9am and 6pm. 60 tablet 0   ? losartan (COZAAR) 25 MG tablet Take 1 tablet (25 mg total) by mouth daily. 30 tablet 0   ? metoprolol tartrate (LOPRESSOR) 50 MG tablet Take 1 tablet (50 mg total) by mouth 2 (two) times a day. 60 tablet 0     No current facility-administered medications on file prior to visit.          FAMILY HISTORY:   Family History   Problem Relation Age of Onset   ? Heart disease Father    ? Heart disease Brother      The family history was reviewed and is not pertinent to the patient's disease/illness    SOCIAL HISTORY:   Social History     Socioeconomic History   ? Marital status:      Spouse name: Not on file   ? Number of children: Not on file   ? Years of education: Not on file   ? Highest education level: Not on file   Occupational History   ? Not on file   Social Needs   ? Financial resource strain: Not on file   ? Food insecurity     Worry: Not on file     Inability: Not on file   ? Transportation needs     Medical: Not on file     Non-medical: Not on file   Tobacco Use   ? Smoking status: Never Smoker   ? Smokeless tobacco: Never Used   Substance and Sexual Activity   ? Alcohol use: Not Currently     Frequency: Monthly or less     Drinks per session: 1 or 2   ? Drug use: Never   ? Sexual activity: Not on file   Lifestyle   ? Physical activity     Days per week: Not on file     Minutes per session: Not on file   ? Stress: Not on file   Relationships   ? Social connections     Talks on phone: Not on file     Gets together: Not on file     Attends [employer] service: Not on file     Active member of club or organization: Not on file     Attends meetings of clubs or organizations: Not on file     Relationship status: Not on file   ? Intimate partner violence     Fear of current or ex partner: Not on file     Emotionally abused: Not on file     Physically abused:  Not on file     Forced sexual activity: Not on file   Other Topics Concern   ? Not on file   Social History Narrative   ? Not on file       REVIEW OF SYSTEMS:  A complete 10 point review of systems was obtained and is negative other than the above stated complaints    PHYSICAL EXAMINATION:   No vitals or physical for this telephone visit.       LABORATORY STUDIES:   Lab Results   Component Value Date    WBC 7.9 03/29/2021    HGB 10.7 (L) 03/29/2021    HCT 34.6 (L) 03/29/2021    MCV 94 03/29/2021     03/29/2021      Lab Results   Component Value Date    CREATININE 1.55 (H) 03/29/2021    BUN 51 (H) 03/29/2021     03/29/2021    K 4.3 03/29/2021     03/29/2021    CO2 28 03/29/2021     No results found for: HGBA1C  EKG: atrial fibrillation, ventricular rate 68.     CARDIAC CATHETERIZATION: This study was personally reviewed by me  Non-obstructive    TRANSTHORACIC ECHOCARDIOGRAM: This study was personally reviewed by me  LVEF 55%, severe aortic stenosis, CRISTHIAN 0.6 cm2, MG 38-41 mmHg, PV 4.3 m/s, trace insufficiency, mild to moderate mitral insufficiency, moderate bi atrial enlargement.     IMPRESSION AND PLAN: Ms. Radha Oorna is a 89 y.o. Year-old female with severe, symptomatic aortic stenosis. Echocardiography demonstrates preserved function with an LVEF of 55% , and coronary angiography demonstrates non-obstructive disease. I recommend aortic valve replacement. I discussed the technical details of the open surgical AVR with the patient, as well as the expected postoperative course and recovery. The risks include but are not limited to bleeding, infection, stroke, heart or graft failure, dysrhythmia, respiratory failure, kidney or liver injury, bowel or limb ischemia, and death. She is a moderate risk surgical candidate due to age and renal failure. Because of that and patient preference, I recommend transcatheter aortic valve replacement. I discussed the technical details of this with her as well. She  understands that there is a risk of bleeding, infection, stroke, heart block requiring permanent pacemaker, cardiac perforation, aortic root rupture and aortic dissection, in addition to risk of death. She wishes to proceed with scheduling for TAVR. We also discussed possible need for emergent conversion to open sternotomy surgery in the event of complications.     - TAVR scheduling per valve clinic  - CPB standby: Undecided. Will inform team prior to surgery. My recommendation is no CPB standby given high risk of mortality and significant morbidity with emergency surgery.     Thank you very much for this referral.       Edu Powers 4/5/2021 1:51 PM

## 2021-06-30 NOTE — PROGRESS NOTES
Progress Notes by Reshma Silva PA-C at 5/6/2021  9:50 AM     Author: Reshma Silva PA-C Service: -- Author Type: Physician Assistant    Filed: 5/6/2021 12:21 PM Encounter Date: 5/6/2021 Status: Signed    : Reshma Silva PA-C (Physician Assistant)           Assessment/Recommendations   Diagnoses and all orders for this visit:    Severe aortic stenosis - S/P TAVR via the left TF approach and using a #26 ISAK valve.  Patient doing well from an overall functional status.  She is getting in-home PT/OT and will do cardiac rehab once discharged from the in-home service.  She will continue aspirin 81 mg daily indefinitely.  Okay to resume all activities as tolerated as groin sites are healing nicely.  Repeat echocardiogram is scheduled for May 19 and she will see Dr. Madrigal on May 20, with labs and EKG    She will see Dr. Gregory for her 6-month follow-up visit in October.    Persistent atrial fibrillation (H) -rate controlled on metoprolol and on Eliquis for stroke prophylaxis.    Stage 3a chronic kidney disease -creatinine stable at 1.17 with GFR 44 when last checked on April 26.  Repeat BMP on May 20 when she sees Dr. Madrigal    Chronic diastolic heart failure, NYHA class II -lower extremity edema somewhat worse.  Stop Lasix.  Start torsemide 20 mg daily tomorrow morning given better bioavailability.  Will reevaluate kidney function when she returns on May 20.  For now, continue beta-blocker and ARB therapy    Thank you for the opportunity to participate in the care of Radha Orona. It's been a pleasure working with her.  Please do not hesitate to call with any questions or concerns.       History of Present Illness/Subjective    I had the opportunity to see Radha Orona at the [facility] for her follow-up visit after transcatheter aortic valve replacement.  One of her sons and one of her daughters accompanies her to the visit today.    Radha has a history of  severe aortic stenosis, moderate MR, atrial fibrillation, chronic kidney disease and hypertension.  She was admitted in March with acute heart failure and noted to have severe aortic stenosis on echocardiogram.  She was sent to the valve clinic and was deemed a good candidate for transcatheter aortic valve replacement.    She underwent valve replacement on April 20 and did have some heart heart block post procedure, but by the next morning telemetry strips looked normal and she had had no significant pauses or evidence of AV block.  Her beta-blocker was resumed and she was sent home later that day with no heart monitor.  The next morning she became dizzy and had syncopal episodes.  EMS was called by family members and on the ambulance ride to the hospital, she was noted to have 15-second pauses associated with unresponsive episodes and did require brief CPR.  She ultimately was taken to [facility] and underwent pacemaker placement.    Today family was a bit upset that patient was discharged from the hospital so quickly given that she then had trouble the very next day and needed a pacemaker.  The patient and her family have several questions today that we go through in detail, some pertaining to medications, but also to therapy.  Patient is currently getting in-home PT/OT and when she follows that I have encouraged her to start outpatient cardiac rehab.    Radha does note that she feels good and has no shortness of breath or chest pain.  She does note that she has a pulled groin muscle and is sore from the brief episodes of CPR that she received.  She also tells me that her lower extremity/feet swelling has been worse since being home from the hospital even though her weight has been decreasing.    Radha Orona denies exertional chest discomfort, palpitations, shortness of breath at rest, PND, orthopnea, lightheadedness, dizziness, pre-syncope or syncope, changes in appetite, nausea or vomiting.    ____________________________________________________________  Pre-discharge echo from April 21:  Interpretation Summary  Afib with RVR.  S/P TAVR with 26mm ES3 valve on 4/20/2021. Mean aortic gradient 6mmHg. No AI.  No pericardial effusion is present.     Physical Examination Review of Systems   Vitals:    05/06/21 0946   BP: 126/66   Pulse: 84   Resp: 16     Body mass index is 31.45 kg/m .  Wt Readings from Last 3 Encounters:   05/06/21 189 lb (85.7 kg)   04/19/21 176 lb (79.8 kg)   03/29/21 190 lb 9.6 oz (86.5 kg)       General Appearance:   Alert, cooperative and in no acute distress   ENT/Mouth: membranes moist, no oral lesions or bleeding gums.      EYES:  no scleral icterus, normal conjunctivae   Neck: Supple without lymphadenopathy.  Thyroid not visualized   Chest/Lungs:   lungs are clear to auscultation, no rales or wheezing   Cardiovascular:   Regular. Normal first and second heart sounds with no murmurs, rubs, or gallops; the carotid, radial and posterior tibial pulses are intact, 2+ edema bilaterally    Abdomen:  Soft and nontender. Bowel sounds are present in all quadrants   Extremities: no cyanosis or clubbing.  Puncture site is soft and nontender with no bruising.  Left precordial incision well healed with dermabond still in place   Skin: no xanthelasma, warm.    Neurologic: normal gait, normal  bilateral, no tremors   Psychiatric: Normal mood and affect    General: WNL  Eyes: WNL  Ears/Nose/Throat: WNL  Lungs: WNL  Heart: Leg Swelling  Stomach: WNL  Bladder: WNL  Muscle/Joints: Muscle Pain  Skin: WNL  Nervous System: WNL  Mental Health: WNL     Blood: WNL     Medical History  Surgical History Family History Social History   Past Medical History:   Diagnosis Date   ? Allergic conjunctivitis of both eyes    ? Atrial fibrillation (H)    ? Chronic gouty arthritis    ? Chronic kidney disease due to hypertension    ? Chronic kidney disease, stage 3a    ? Class 1 obesity    ? Environmental allergies     ? Hyperlipidemia    ? Vitamin D deficiency     Past Surgical History:   Procedure Laterality Date   ? BILATERAL OOPHORECTOMY Bilateral     30 years ago per patient report   ? CV CORONARY ANGIOGRAM N/A 3/29/2021    Procedure: Coronary Angiogram;  Surgeon: Zackery Madrigal MD;  Location: [facility];  Service: Cardiology   ? VARICOSE VEIN SURGERY      Family History   Problem Relation Age of Onset   ? Heart disease Father    ? Heart disease Brother     Social History     Socioeconomic History   ? Marital status:      Spouse name: Not on file   ? Number of children: Not on file   ? Years of education: Not on file   ? Highest education level: Not on file   Occupational History   ? Not on file   Social Needs   ? Financial resource strain: Not on file   ? Food insecurity     Worry: Not on file     Inability: Not on file   ? Transportation needs     Medical: Not on file     Non-medical: Not on file   Tobacco Use   ? Smoking status: Never Smoker   ? Smokeless tobacco: Never Used   Substance and Sexual Activity   ? Alcohol use: Not Currently     Frequency: Monthly or less     Drinks per session: 1 or 2   ? Drug use: Never   ? Sexual activity: Not on file   Lifestyle   ? Physical activity     Days per week: Not on file     Minutes per session: Not on file   ? Stress: Not on file   Relationships   ? Social connections     Talks on phone: Not on file     Gets together: Not on file     Attends [employer] service: Not on file     Active member of club or organization: Not on file     Attends meetings of clubs or organizations: Not on file     Relationship status: Not on file   ? Intimate partner violence     Fear of current or ex partner: Not on file     Emotionally abused: Not on file     Physically abused: Not on file     Forced sexual activity: Not on file   Other Topics Concern   ? Not on file   Social History Narrative   ? Not on file          Medications  Allergies   Current Outpatient Medications    Medication Sig Dispense Refill   ? allopurinoL (ZYLOPRIM) 300 MG tablet Take 300 mg by mouth daily.     ? apixaban ANTICOAGULANT (ELIQUIS) 5 mg Tab tablet Take 1 tablet (5 mg total) by mouth 2 (two) times a day. 60 tablet 3   ? aspirin (ENTERIC COATED ASPIRIN) 81 MG EC tablet Take 1 tablet by mouth daily.     ? cetirizine (ZYRTEC) 10 MG tablet Take 10 mg by mouth daily.     ? cholecalciferol, vitamin D3, 1,000 unit (25 mcg) tablet Take 1,000 Units by mouth daily.     ? losartan (COZAAR) 25 MG tablet Take 1 tablet (25 mg total) by mouth daily. 30 tablet 0   ? metoprolol tartrate (LOPRESSOR) 50 MG tablet Take 1 tablet (50 mg total) by mouth 2 (two) times a day. 60 tablet 11   ? torsemide (DEMADEX) 20 MG tablet TAKE 1 TABLET(20 MG) BY MOUTH DAILY 30 tablet 0     No current facility-administered medications for this visit.     Allergies   Allergen Reactions   ? Celecoxib      hives   ? Sulfa (Sulfonamide Antibiotics)      rash         Lab Results    Chemistry/lipid CBC Cardiac Enzymes/BNP/TSH/INR   Lab Results   Component Value Date    CHOL 255 (H) 08/11/2020    HDL 52 08/11/2020    LDLCALC 180 (H) 08/11/2020    TRIG 115 08/11/2020    CREATININE 1.48 (H) 04/19/2021    BUN 48 (H) 04/19/2021    K 4.4 04/19/2021     04/19/2021    CL 99 04/19/2021    CO2 32 (H) 04/19/2021    Lab Results   Component Value Date    WBC 8.8 04/19/2021    HGB 11.2 (L) 04/19/2021    HCT 36.9 04/19/2021    MCV 95 04/19/2021     04/19/2021    Lab Results   Component Value Date    TROPONINI 0.03 03/15/2021     (H) 04/19/2021    TSH 3.53 02/23/2021    INR 1.06 04/19/2021        42 minutes spent on the date of encounter doing chart review, review of outside records, review of test results, interpretation with above tests, patient visit, documentation, discussion with other provider and discussion with family.      This note has been dictated using voice recognition software. Any grammatical or context distortions are  unintentional and inherent to the software.

## 2021-06-30 NOTE — PROGRESS NOTES
"Progress Notes by Zackery Madrigal MD at 3/4/2021  7:50 AM     Author: Zackery Madrigal MD Service: -- Author Type: Physician    Filed: 3/4/2021  9:51 AM Encounter Date: 3/4/2021 Status: Signed    : Zackery Madrigal MD (Physician)           The patient has been notified of following:     \"This video visit will be conducted via a call between you and your physician/provider. We have found that certain health care needs can be provided without the need for an in-person physical exam.  This service lets us provide the care you need with a video conversation.  If a prescription is necessary we can send it directly to your pharmacy.  If lab work is needed we can place an order for that and you can then stop by our lab to have the test done at a later time.      Patient has given verbal consent to a Video visit? Yes    HEART CARE VIDEO ENCOUNTER        The patient has chosen to have the visit conducted as a video visit, to reduce risk of exposure given the current status of Coronavirus in our community. This video visit is being conducted via a call between the patient and physician/provider. Health care needs are being provided without a physical exam.     Assessment/Recommendations   Assessment/Plan: Pt. is an 89F w/ severe aortic stenosis, moderate MR, HTN, newer onset A fib (on Eliquis and Metoprolol), HL, CKD, gout who is here for evaluation of management options for her valvular disease.    # Aortic stenosis - severe w/ P/M 74/38-41 CRISTHIAN 0.6, DI 0.2, SVI 30, now sympto,atic  - we had a very long discussion with the patient and her family, and had a chance to go through natural history of aortic stenosis and treatment options, ranging from emdical therapy ro TAVR to SAVR  - she is interested in pursuing TAVR w/u, w/ the understanding of risks, including additional risks due to her age and CKD  - she will need angiography +/- PCI, CTA TAVR protocol, CTS consult, and Dental evaluation  - in the meantime, " since her breathing is much better but cre bumped to 1.7 from b/l of 1.3-1.5, asked her to take furosemide three times a week (M/W/F), and re-check labs next Fri  - if no improvement, consider stopping ARB next week    # MR- at least moderate, w/ associated significant posterior MAC, and likely smaller size  - would start with addressing aortic stenosis first, and if has good functional improvement, would manage mitral disease medically    # AF - continue metop/apixaban    # HTN - on metop/losartan, as per above      I have reviewed the note as documented.  This accurately captures the substance of my conversation with the patient.    Total time of video between patient and provider was 50 minutes   Start Time:749   Stop Time:839    Originating Location (pt. Location): Home    Distant Location (provider location):  [facility]     Mode of Communication:  Video Conference via [email or web address], then switched to phone       History of Present Illness/Subjective    Radha Orona is a 89 y.o. female who is being evaluated via a billable video visit and has consented to a video visit. Radha Orona has a history of w/ severe aortic stenosis, moderate MR, HTN, newer onset A fib (on Eliquis and Metoprolol), HL, CKD, gout who is here for evaluation of management options for her valvular disease.    Progressive functional deterioration over the past year, more pronounced 3-4 months, ultimately leading her to stop daily activity, such as house work, to stop and rest. Admitted in 2/21 for worsening dyspnea, oethopnea, some chest pressure, found to be in new AF w/ controlled ventricular response. She was started on diuretics, BB switched for atenolol to metoprolol, and apixaban was added. Of note, her cre was in 1.6-1.7 range, TnI's were -. TTE showed preserved LVEF, but severe aortic stenosis, w/ at least moderate MR. She improved enough to go home and is now seen in Valve Clinic for consideration of  further management options.     She has had shortness of breath at rest, not sure about PND or orthopnea, has ankle edema by the end of the day by the end of the day if on her feet a lot, no CP currently, no syncope, + occasional lightheadedness, no N/V/D/F/C.    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    I have reviewed and updated the patient's Past Medical History, Social History, Family History and Medication List.     Physical Examination performed via live video encounter Review of Systems   General Appearance:   no distress, normal body habitus, upright.   ENT/Mouth: membranes moist, no nasal discharge or bleeding gums.  Normal head shape, no evidence of injury or laceration.     EYES:  no scleral icterus, normal conjunctivae   Neck: no evidence of thyromegaly.  Supple   Chest/Lungs:   No audible wheezing equal chest wall expansion. Non labored breathing.  No cough.   Cardiovascular:   No evidence of elevated jugular venous pressure.  No evidence of pitting edema bilaterally    Abdomen:  no evidence of abdominal distention. No observe juandice.     Extremities: no cyanosis or clubbing noted.    Skin: no xanthelasma, normal skin color. No evidence of facial lacerations.      Neurologic: Normal arm motion bilateral, no tremors.  No evidence of focal defect.       Psychiatric: alert and oriented x3, calm                                               Medical History  Surgical History Family History Social History   Past Medical History:   Diagnosis Date   ? Allergic conjunctivitis of both eyes    ? Chronic gouty arthritis    ? Chronic kidney disease due to hypertension    ? Chronic kidney disease, stage 3a    ? Class 1 obesity    ? Environmental allergies    ? Hyperlipidemia    ? Vitamin D deficiency     Past Surgical History:   Procedure Laterality Date   ? BILATERAL OOPHORECTOMY Bilateral     30 years ago per patient report    Family History   Problem Relation Age of Onset   ? Heart disease  Father    ? Heart disease Brother       Social History     Socioeconomic History   ? Marital status:      Spouse name: Not on file   ? Number of children: Not on file   ? Years of education: Not on file   ? Highest education level: Not on file   Occupational History   ? Not on file   Social Needs   ? Financial resource strain: Not on file   ? Food insecurity     Worry: Not on file     Inability: Not on file   ? Transportation needs     Medical: Not on file     Non-medical: Not on file   Tobacco Use   ? Smoking status: Never Smoker   Substance and Sexual Activity   ? Alcohol use: Yes     Frequency: Monthly or less     Drinks per session: 1 or 2   ? Drug use: Never   ? Sexual activity: Not on file   Lifestyle   ? Physical activity     Days per week: Not on file     Minutes per session: Not on file   ? Stress: Not on file   Relationships   ? Social connections     Talks on phone: Not on file     Gets together: Not on file     Attends Pent[employer]ism service: Not on file     Active member of club or organization: Not on file     Attends meetings of clubs or organizations: Not on file     Relationship status: Not on file   ? Intimate partner violence     Fear of current or ex partner: Not on file     Emotionally abused: Not on file     Physically abused: Not on file     Forced sexual activity: Not on file   Other Topics Concern   ? Not on file   Social History Narrative   ? Not on file          Medications  Allergies   Current Outpatient Medications   Medication Sig Dispense Refill   ? allopurinoL (ZYLOPRIM) 300 MG tablet Take 300 mg by mouth daily.     ? apixaban ANTICOAGULANT (ELIQUIS) 5 mg Tab tablet Take 1 tablet (5 mg total) by mouth 2 (two) times a day. 60 tablet 0   ? cholecalciferol, vitamin D3, 1,000 unit (25 mcg) tablet Take 1,000 Units by mouth daily.     ? furosemide (LASIX) 20 MG tablet Take 1 tablet (20 mg total) by mouth daily. 30 tablet 0   ? losartan (COZAAR) 50 MG tablet Take 50 mg by mouth 2 (two)  times a day.     ? metoprolol tartrate (LOPRESSOR) 50 MG tablet Take 1 tablet (50 mg total) by mouth 2 (two) times a day. 60 tablet 0     No current facility-administered medications for this visit.     Allergies   Allergen Reactions   ? Celecoxib      Other reaction(s): *Unknown, Unknown   ? Sulfa (Sulfonamide Antibiotics)      Other reaction(s): *Unknown, Unknown         Lab Results    Chemistry/lipid CBC Cardiac Enzymes/BNP/TSH/INR   Lab Results   Component Value Date    CHOL 255 (H) 08/11/2020    HDL 52 08/11/2020    LDLCALC 180 (H) 08/11/2020    TRIG 115 08/11/2020    CREATININE 1.72 (H) 03/02/2021    BUN 71 (H) 03/02/2021    K 4.4 03/02/2021     03/02/2021     03/02/2021    CO2 24 03/02/2021    Lab Results   Component Value Date    WBC 8.8 02/23/2021    HGB 12.1 02/23/2021    HCT 38.2 02/23/2021    MCV 92 02/23/2021     02/25/2021    Lab Results   Component Value Date    TROPONINI 0.03 02/24/2021     (H) 02/23/2021    TSH 3.53 02/23/2021        Zackery Madrigal

## 2021-06-30 NOTE — PROGRESS NOTES
Progress Notes by Reshma Silva PA-C at 4/19/2021 11:10 AM     Author: Reshma Silva PA-C Service: -- Author Type: Physician Assistant    Filed: 4/19/2021  3:59 PM Encounter Date: 4/19/2021 Status: Signed    : Reshma Silva PA-C (Physician Assistant)           Assessment/Recommendations   Problem List Items Addressed This Visit     Atrial fibrillation (H)    Relevant Medications    metoprolol tartrate (LOPRESSOR) 50 MG tablet    furosemide (LASIX) 20 MG tablet    Severe aortic valve stenosis - Primary    Relevant Medications    metoprolol tartrate (LOPRESSOR) 50 MG tablet    furosemide (LASIX) 20 MG tablet    Essential hypertension    Relevant Medications    metoprolol tartrate (LOPRESSOR) 50 MG tablet    furosemide (LASIX) 20 MG tablet    (HFpEF) heart failure with preserved ejection fraction (H)    Relevant Medications    metoprolol tartrate (LOPRESSOR) 50 MG tablet    furosemide (LASIX) 20 MG tablet      Other Visit Diagnoses     Atrial fibrillation, new onset (H)        Relevant Medications    apixaban ANTICOAGULANT (ELIQUIS) 5 mg Tab tablet    metoprolol tartrate (LOPRESSOR) 50 MG tablet    furosemide (LASIX) 20 MG tablet    Chronic diastolic congestive heart failure (H)        Relevant Medications    metoprolol tartrate (LOPRESSOR) 50 MG tablet    furosemide (LASIX) 20 MG tablet          1.  Aortic Stenosis: This has become severe and is now causing shortness of breath and a recent acute heart failure exacerbation, requiring hospital admission.  She has seen a decline in her functional status.  She has been evaluated by a multidisciplinary team and has been deemed a good candidate for transcatheter aortic valve replacement.  She has now undergone all the required workup for TAVR and wishes to proceed.   We discussed the procedure in detail,  including post-procedure care, restrictions and follow up.   She understands that there is a 4-5% risk of bleeding, infection,  stroke, heart block requiring permanent pacemaker, cardiac perforation, aortic root rupture, dissection and death.  Patient also understands that if something unexpected happens, the procedure may need to be stopped or changed to help her.     All questions were answered   Consents were signed and witnessed by me.  The patient and her family have discussed their options in case of complications during the procedure.  Patient consents to subxiphoid window or peripheral vascular repair, but would not want urgent sternotomy or cardiopulmonary bypass in the rare event that aortic dissection or aortic rupture occur.   She has never had trouble with anesthesia in the past.    Labs today reveal Hgb 11.2, normal WBC, electrolytes WNL and creat 1.48 (stable from previous)    The plan will be for MAC with transthoracic echo imaging prior to and following valve deployment.  We will plan on using the sentinel embolic protection device.  We will use the Fernandes ISAK valve. Radha Orona will report tomorrow morning for the procedure and all instructions regarding times and medications were given by TAVR coordinator RN.     2. Chronic atrial fibrillation -controlled and normally on Eliquis for stroke prophylaxis.  She has been holding her Eliquis since Thursday for anticipated procedure.    3. Acute on chronic diastolic heart failure, NYHA class III - secondary to aortic valve disease.  Hospitalized in March.   Now stable on furosemide 20 mg twice daily.  Still with mild lower extremity edema and elevated BNP    4. Hypertension -   Blood pressure today is controlled.  She will continue furosemide 20 mg two times a day, losartan 25 mg daily and metoprolol tartrate 50 mg two times a day.        History of Present Illness/Subjective    Radha Orona is a 89 y.o. female who comes in today for history and physical prior to TAVR (transcatheter aortic valve replacement).   Her daughter accompanies her to the visit today.     Radha  presented to the hospital in Feb with new onset atrial fibrillation and acute heart failure.  Echocardiogram during that admission showed severe aortic stenosis.  She was set up to see our team in valve clinic as an outpatient and after discussion with a multidisciplinary team, was deemed a good candidate for TAVR.      Radha Orona has shortness of breath with very minimal activity, but denies chest discomfort, palpitations, paroxysmal nocturnal dyspnea, orthopnea, lightheadedness, dizziness, pre-syncope, or syncope.  Radha Orona also denies any weight loss, changes in appetite, nausea or vomiting.     Medical, surgical, family, social history, and medications were reviewed and updated as necessary.    ECG (personally reviewed): 4/19/2021 shows atrial fibrillation with controlled ventricular response    ECHOCARDIOGRAM done on Feb 24:  1. Normal left ventricular size and systolic performance with a visually estimated ejection fraction of 55%.   2. There is severe aortic stenosis.     The mean gradient is measured at 38-41 mmHg with peak velocity of 4.3 m/s.  Calculated valve area 0.6 cm . VRVTI = 0.2. VRvel = 0.2.   3. There is trace aortic insufficiency.   4. There is mild-moderate, to perhaps moderate, mitral insufficiency.  5. Normal right ventricular size and systolic performance.   6. There is moderate left atrial enlargement. There is mild right atrial enlargement.     CATH done on March 29:  Findings:  LM:no obstruction  LAD:mildly irregular  Lcx:mildly irregular  RCA:dominant, mildly irregular       Physical Examination Review of Systems   Vitals:    04/19/21 1151   BP: 116/60   Pulse: 66   Resp: 14   SpO2: 95%     Body mass index is 29.29 kg/m .  Wt Readings from Last 3 Encounters:   04/19/21 176 lb (79.8 kg)   03/29/21 190 lb 9.6 oz (86.5 kg)   03/17/21 187 lb 14.4 oz (85.2 kg)       General Appearance:   Alert, cooperative and in no acute distress   ENT/Mouth: membranes moist, no oral lesions or bleeding  gums.      EYES:  no scleral icterus, normal conjunctivae   Neck: no carotid bruits.  Thyroid not visualized   Chest/Lungs:   lungs are clear to auscultation, no rales or wheezing   Cardiovascular:   Irregularly irregular. Normal first and second heart sounds with 3/6 systolic murmur.  No rubs or gallops; the carotid, radial and posterior tibial pulses are intact, 1+ edema bilaterally    Abdomen:  Soft and nontender. Bowel sounds are present in all quadrants   Extremities: no cyanosis or clubbing   Skin: no xanthelasma, warm.    Neurologic: normal gait, normal  bilateral, no tremors   Psychiatric: Normal mood and affect    General: WNL  Eyes: WNL  Ears/Nose/Throat: WNL  Lungs: Cough, Shortness of Breath  Heart: WNL  Stomach: WNL  Bladder: WNL  Muscle/Joints: WNL  Skin: WNL  Nervous System: WNL  Mental Health: WNL     Blood: Easy Bruising     Medical History  Surgical History Family History Social History   Past Medical History:   Diagnosis Date   ? Allergic conjunctivitis of both eyes    ? Atrial fibrillation (H)    ? Chronic gouty arthritis    ? Chronic kidney disease due to hypertension    ? Chronic kidney disease, stage 3a    ? Class 1 obesity    ? Environmental allergies    ? Hyperlipidemia    ? Vitamin D deficiency     Past Surgical History:   Procedure Laterality Date   ? BILATERAL OOPHORECTOMY Bilateral     30 years ago per patient report   ? CV CORONARY ANGIOGRAM N/A 3/29/2021    Procedure: Coronary Angiogram;  Surgeon: Zackery Madrigal MD;  Location: [facility];  Service: Cardiology   ? VARICOSE VEIN SURGERY      Family History   Problem Relation Age of Onset   ? Heart disease Father    ? Heart disease Brother     Social History     Socioeconomic History   ? Marital status:      Spouse name: Not on file   ? Number of children: Not on file   ? Years of education: Not on file   ? Highest education level: Not on file   Occupational History   ? Not on file   Social Needs   ?  Financial resource strain: Not on file   ? Food insecurity     Worry: Not on file     Inability: Not on file   ? Transportation needs     Medical: Not on file     Non-medical: Not on file   Tobacco Use   ? Smoking status: Never Smoker   ? Smokeless tobacco: Never Used   Substance and Sexual Activity   ? Alcohol use: Not Currently     Frequency: Monthly or less     Drinks per session: 1 or 2   ? Drug use: Never   ? Sexual activity: Not on file   Lifestyle   ? Physical activity     Days per week: Not on file     Minutes per session: Not on file   ? Stress: Not on file   Relationships   ? Social connections     Talks on phone: Not on file     Gets together: Not on file     Attends Gnosticism service: Not on file     Active member of club or organization: Not on file     Attends meetings of clubs or organizations: Not on file     Relationship status: Not on file   ? Intimate partner violence     Fear of current or ex partner: Not on file     Emotionally abused: Not on file     Physically abused: Not on file     Forced sexual activity: Not on file   Other Topics Concern   ? Not on file   Social History Narrative   ? Not on file          Medications  Allergies   Current Outpatient Medications   Medication Sig Dispense Refill   ? allopurinoL (ZYLOPRIM) 300 MG tablet Take 300 mg by mouth daily.     ? cetirizine (ZYRTEC) 10 MG tablet Take 10 mg by mouth daily.     ? cholecalciferol, vitamin D3, 1,000 unit (25 mcg) tablet Take 1,000 Units by mouth daily.     ? furosemide (LASIX) 20 MG tablet Take 1 tablet (20 mg total) by mouth 2 (two) times a day at 9am and 6pm. 180 tablet 3   ? losartan (COZAAR) 25 MG tablet Take 1 tablet (25 mg total) by mouth daily. 30 tablet 0   ? metoprolol tartrate (LOPRESSOR) 50 MG tablet Take 1 tablet (50 mg total) by mouth 2 (two) times a day. 60 tablet 11   ? apixaban ANTICOAGULANT (ELIQUIS) 5 mg Tab tablet Take 1 tablet (5 mg total) by mouth 2 (two) times a day. 60 tablet 3     No current  facility-administered medications for this visit.     Allergies   Allergen Reactions   ? Celecoxib      hives   ? Sulfa (Sulfonamide Antibiotics)      rash         Lab Results    Chemistry/lipid CBC Cardiac Enzymes/BNP/TSH/INR   Lab Results   Component Value Date    CHOL 255 (H) 08/11/2020    HDL 52 08/11/2020    LDLCALC 180 (H) 08/11/2020    TRIG 115 08/11/2020    CREATININE 1.48 (H) 04/19/2021    BUN 48 (H) 04/19/2021    K 4.4 04/19/2021     04/19/2021    CL 99 04/19/2021    CO2 32 (H) 04/19/2021    Lab Results   Component Value Date    WBC 8.8 04/19/2021    HGB 11.2 (L) 04/19/2021    HCT 36.9 04/19/2021    MCV 95 04/19/2021     04/19/2021    Lab Results   Component Value Date    TROPONINI 0.03 03/15/2021     (H) 04/19/2021    TSH 3.53 02/23/2021    INR 1.06 04/19/2021          50 minutes spent on the date of encounter doing education, consent signing, chart prep/review, review of test results, patient visit, documentation and discussion with family.        This note has been dictated using voice recognition software. Any grammatical or context distortions are unintentional and inherent to the software.

## 2021-06-30 NOTE — PROGRESS NOTES
"Progress Notes by Karol He CNP at 4/2/2021  1:30 PM     Author: Karol He CNP Service: -- Author Type: Nurse Practitioner    Filed: 4/2/2021  1:52 PM Encounter Date: 4/2/2021 Status: Signed    : Karol He CNP (Nurse Practitioner)           The patient has been notified of following:     \"This video visit will be conducted via a call between you and your physician/provider. We have found that certain health care needs can be provided without the need for an in-person physical exam.  This service lets us provide the care you need with a video conversation.  If a prescription is necessary we can send it directly to your pharmacy.  If lab work is needed we can place an order for that and you can then stop by our lab to have the test done at a later time.      Patient has given verbal consent to a Video visit? Yes    HEART CARE VIDEO ENCOUNTER        The patient has chosen to have the visit conducted as a video visit, to reduce risk of exposure given the current status of Coronavirus in our community. This video visit is being conducted via a call between the patient and physician/provider. Health care needs are being provided without a physical exam.     Assessment/Recommendations   Assessment:    1.  Heart failure with preserved ejection fraction: She has no symptoms of acute heart failure.  Dyspnea on exertion is stable.  She has minimal edema, no abdominal bloating, and weight is stable.     2.  Severe aortic stenosis: She is in the process of work-up for TAVR.    3.  Hypertension: Blood pressure 98/58.  She is asymptomatic with lower blood pressures.  We discussed the need to stay hydrated and drink about 60 ounces of fluid daily.  She estimates that she only drinks about 40 ounces daily.    Plan:  1.  Continue current medications  2.  She was asked to call the clinic if she has lightheadedness with low blood pressures.  Medications will need to be adjusted.  3.  " Low-sodium diet and daily weights  4.  Increase fluid intake to 60 ounces daily    Radha Orona will have an appointment with Dr. Powers on April 5 as part of work-up for TAVR.    I have reviewed the note as documented.  This accurately captures the substance of my conversation with the patient.    Total time of video between patient and provider was 11 minutes   Start Time: 1:30   Stop Time: 1:41    Originating Location (pt. Location): Home    Distant Location (provider location):  [facility]     Mode of Communication:  Video Conference via [employer]       History of Present Illness/Subjective    Ms. Radha Orona is a 89 y.o. female seen at [facility] today for hospital follow-up.  She was hospitalized February 23 to February 25, 2021 with new atrial fibrillation and acute heart failure.  Echocardiogram on February 24, 2021 showed ejection fraction of 55%, severe aortic stenosis, and mild to moderate mitral regurgitation.  She was referred to valve clinic and is in the process of work-up for TAVR.  She was again hospitalized March 15 to March 17, 2021 with acute heart failure exacerbation.  Coronary angiogram on March 29, 2021 showed nonobstructive coronary artery disease.    She denies any symptoms of acute fluid retention.  She has chronic shortness of breath with activity but is at baseline.  She has minimal edema in her legs.  She denies lightheadedness, orthopnea, chest pain and abdominal fullness/bloating.  She notes decreased energy over the past week which may be related to COVID-19 vaccine.    She is monitoring home weights which are stable at 190 pounds.  She is following a low sodium diet.        ECHO:   Results for orders placed during the hospital encounter of 02/23/21   Echo Complete [ECH10] 02/24/2021    Narrative 1. Normal left ventricular size and systolic performance with a visually   estimated ejection fraction of 55%.   2. There is severe aortic  stenosis.     The mean gradient is measured at 38-41 mmHg with peak velocity of 4.3   m/s.  Calculated valve area 0.6 cm . VRVTI = 0.2. VRvel = 0.2.   3. There is trace aortic insufficiency.   4. There is mild-moderate, to perhaps moderate, mitral insufficiency.  5. Normal right ventricular size and systolic performance.   6. There is moderate left atrial enlargement. There is mild right atrial   enlargement.         Physical Examination performed via live video encounter Review of Systems   General Appearance:   no distress, normal body habitus, upright.   ENT/Mouth: membranes moist, no nasal discharge or bleeding gums.  Normal head shape, no evidence of injury or laceration.     EYES:  no scleral icterus, normal conjunctivae   Neck: no evidence of thyromegaly.  Supple   Chest/Lungs:   No audible wheezing equal chest wall expansion. Non labored breathing.  No cough.   Cardiovascular:   No evidence of elevated jugular venous pressure.  No evidence of pitting edema bilaterally    Abdomen:  no evidence of abdominal distention. No observe juandice.     Extremities: no cyanosis or clubbing noted.    Skin: no xanthelasma, normal skin color. No evidence of facial lacerations.      Neurologic: Normal arm motion bilateral, no tremors.  No evidence of focal defect.       Psychiatric: alert and oriented x3, calm     She reports no pain or tenderness of right radial puncture site                                              Medical History  Surgical History Family History Social History   Past Medical History:   Diagnosis Date   ? Allergic conjunctivitis of both eyes    ? Atrial fibrillation (H)    ? Chronic gouty arthritis    ? Chronic kidney disease due to hypertension    ? Chronic kidney disease, stage 3a    ? Class 1 obesity    ? Environmental allergies    ? Hyperlipidemia    ? Vitamin D deficiency     Past Surgical History:   Procedure Laterality Date   ? BILATERAL OOPHORECTOMY Bilateral     30 years ago per patient  report   ? CV CORONARY ANGIOGRAM N/A 3/29/2021    Procedure: Coronary Angiogram;  Surgeon: Zackery Madrigal MD;  Location: [facility];  Service: Cardiology   ? VARICOSE VEIN SURGERY      Family History   Problem Relation Age of Onset   ? Heart disease Father    ? Heart disease Brother       Social History     Socioeconomic History   ? Marital status:      Spouse name: Not on file   ? Number of children: Not on file   ? Years of education: Not on file   ? Highest education level: Not on file   Occupational History   ? Not on file   Social Needs   ? Financial resource strain: Not on file   ? Food insecurity     Worry: Not on file     Inability: Not on file   ? Transportation needs     Medical: Not on file     Non-medical: Not on file   Tobacco Use   ? Smoking status: Never Smoker   ? Smokeless tobacco: Never Used   Substance and Sexual Activity   ? Alcohol use: Not Currently     Frequency: Monthly or less     Drinks per session: 1 or 2   ? Drug use: Never   ? Sexual activity: Not on file   Lifestyle   ? Physical activity     Days per week: Not on file     Minutes per session: Not on file   ? Stress: Not on file   Relationships   ? Social connections     Talks on phone: Not on file     Gets together: Not on file     Attends Holiness service: Not on file     Active member of club or organization: Not on file     Attends meetings of clubs or organizations: Not on file     Relationship status: Not on file   ? Intimate partner violence     Fear of current or ex partner: Not on file     Emotionally abused: Not on file     Physically abused: Not on file     Forced sexual activity: Not on file   Other Topics Concern   ? Not on file   Social History Narrative   ? Not on file          Medications  Allergies   Current Outpatient Medications   Medication Sig Dispense Refill   ? allopurinoL (ZYLOPRIM) 300 MG tablet Take 300 mg by mouth daily.     ? apixaban ANTICOAGULANT (ELIQUIS) 5 mg Tab tablet Take 1  tablet (5 mg total) by mouth 2 (two) times a day. 60 tablet 0   ? cetirizine (ZYRTEC) 10 MG tablet Take 10 mg by mouth daily.     ? cholecalciferol, vitamin D3, 1,000 unit (25 mcg) tablet Take 1,000 Units by mouth daily.     ? furosemide (LASIX) 20 MG tablet Take 1 tablet (20 mg total) by mouth 2 (two) times a day at 9am and 6pm. 60 tablet 0   ? losartan (COZAAR) 25 MG tablet Take 1 tablet (25 mg total) by mouth daily. 30 tablet 0   ? metoprolol tartrate (LOPRESSOR) 50 MG tablet Take 1 tablet (50 mg total) by mouth 2 (two) times a day. 60 tablet 0     No current facility-administered medications for this visit.     Allergies   Allergen Reactions   ? Celecoxib      hives   ? Sulfa (Sulfonamide Antibiotics)      rash         Lab Results    Chemistry/lipid CBC Cardiac Enzymes/BNP/TSH/INR   Lab Results   Component Value Date    CHOL 255 (H) 08/11/2020    HDL 52 08/11/2020    LDLCALC 180 (H) 08/11/2020    TRIG 115 08/11/2020    CREATININE 1.55 (H) 03/29/2021    BUN 51 (H) 03/29/2021    K 4.3 03/29/2021     03/29/2021     03/29/2021    CO2 28 03/29/2021    Lab Results   Component Value Date    WBC 7.9 03/29/2021    HGB 10.7 (L) 03/29/2021    HCT 34.6 (L) 03/29/2021    MCV 94 03/29/2021     03/29/2021    Lab Results   Component Value Date    TROPONINI 0.03 03/15/2021     (H) 03/15/2021    TSH 3.53 02/23/2021        Karol He

## 2021-07-04 NOTE — ADDENDUM NOTE
Addendum Note by Analia Kirk, RN at 4/14/2021  9:33 AM     Author: Analia Kirk, RN Service: -- Author Type: Registered Nurse    Filed: 4/14/2021  9:33 AM Encounter Date: 4/9/2021 Status: Signed    : Analia Kirk RN (Registered Nurse)    Addended by: ANALIA KIRK on: 4/14/2021 09:33 AM        Modules accepted: Orders        
Living situation: house with , daughter, grandchildren  Occupation: retired ()    Tobacco use: quit 40yrs ago (20 pack year hx)  EtOH use: none  Illicit drug use: none

## 2021-07-29 ENCOUNTER — COMMUNICATION - HEALTHEAST (OUTPATIENT)
Dept: CARDIOLOGY | Facility: CLINIC | Age: 86
End: 2021-07-29

## 2021-08-11 ENCOUNTER — DOCUMENTATION ONLY (OUTPATIENT)
Dept: OTHER | Facility: CLINIC | Age: 86
End: 2021-08-11

## 2021-08-22 ENCOUNTER — HEALTH MAINTENANCE LETTER (OUTPATIENT)
Age: 86
End: 2021-08-22

## 2021-10-17 ENCOUNTER — HEALTH MAINTENANCE LETTER (OUTPATIENT)
Age: 86
End: 2021-10-17

## 2021-10-26 ENCOUNTER — LAB REQUISITION (OUTPATIENT)
Dept: LAB | Facility: CLINIC | Age: 86
End: 2021-10-26
Payer: MEDICARE

## 2021-10-26 DIAGNOSIS — M1A.09X0 IDIOPATHIC CHRONIC GOUT, MULTIPLE SITES, WITHOUT TOPHUS (TOPHI): ICD-10-CM

## 2021-10-26 DIAGNOSIS — E78.00 PURE HYPERCHOLESTEROLEMIA, UNSPECIFIED: ICD-10-CM

## 2021-10-26 DIAGNOSIS — I12.9 HYPERTENSIVE CHRONIC KIDNEY DISEASE WITH STAGE 1 THROUGH STAGE 4 CHRONIC KIDNEY DISEASE, OR UNSPECIFIED CHRONIC KIDNEY DISEASE: ICD-10-CM

## 2021-10-26 PROCEDURE — 84550 ASSAY OF BLOOD/URIC ACID: CPT | Mod: ORL | Performed by: FAMILY MEDICINE

## 2021-10-26 PROCEDURE — 80061 LIPID PANEL: CPT | Mod: ORL | Performed by: FAMILY MEDICINE

## 2021-10-26 PROCEDURE — 80048 BASIC METABOLIC PNL TOTAL CA: CPT | Mod: ORL | Performed by: FAMILY MEDICINE

## 2021-10-27 LAB
ANION GAP SERPL CALCULATED.3IONS-SCNC: 14 MMOL/L (ref 5–18)
BUN SERPL-MCNC: 46 MG/DL (ref 8–28)
CALCIUM SERPL-MCNC: 9.9 MG/DL (ref 8.5–10.5)
CHLORIDE BLD-SCNC: 100 MMOL/L (ref 98–107)
CHOLEST SERPL-MCNC: 243 MG/DL
CO2 SERPL-SCNC: 26 MMOL/L (ref 22–31)
CREAT SERPL-MCNC: 1.3 MG/DL (ref 0.6–1.1)
FASTING STATUS PATIENT QL REPORTED: ABNORMAL
GFR SERPL CREATININE-BSD FRML MDRD: 36 ML/MIN/1.73M2
GLUCOSE BLD-MCNC: 106 MG/DL (ref 70–125)
HDLC SERPL-MCNC: 58 MG/DL
LDLC SERPL CALC-MCNC: 164 MG/DL
POTASSIUM BLD-SCNC: 4.2 MMOL/L (ref 3.5–5)
SODIUM SERPL-SCNC: 140 MMOL/L (ref 136–145)
TRIGL SERPL-MCNC: 104 MG/DL
URATE SERPL-MCNC: 4.1 MG/DL (ref 2–7.5)

## 2022-01-18 VITALS
WEIGHT: 189 LBS | BODY MASS INDEX: 31.45 KG/M2 | HEART RATE: 84 BPM | DIASTOLIC BLOOD PRESSURE: 66 MMHG | SYSTOLIC BLOOD PRESSURE: 126 MMHG | RESPIRATION RATE: 16 BRPM

## 2022-01-18 VITALS
OXYGEN SATURATION: 95 % | WEIGHT: 176 LBS | RESPIRATION RATE: 14 BRPM | HEART RATE: 66 BPM | SYSTOLIC BLOOD PRESSURE: 116 MMHG | BODY MASS INDEX: 29.32 KG/M2 | DIASTOLIC BLOOD PRESSURE: 60 MMHG | HEIGHT: 65 IN

## 2022-01-18 VITALS
DIASTOLIC BLOOD PRESSURE: 76 MMHG | BODY MASS INDEX: 30.99 KG/M2 | HEIGHT: 65 IN | HEART RATE: 84 BPM | WEIGHT: 186 LBS | RESPIRATION RATE: 16 BRPM | SYSTOLIC BLOOD PRESSURE: 122 MMHG

## 2022-01-18 VITALS
TEMPERATURE: 97.7 F | OXYGEN SATURATION: 98 % | SYSTOLIC BLOOD PRESSURE: 111 MMHG | DIASTOLIC BLOOD PRESSURE: 85 MMHG | RESPIRATION RATE: 18 BRPM | HEART RATE: 73 BPM

## 2022-01-18 VITALS — WEIGHT: 190.6 LBS | BODY MASS INDEX: 31.75 KG/M2 | HEIGHT: 65 IN

## 2022-10-01 ENCOUNTER — HEALTH MAINTENANCE LETTER (OUTPATIENT)
Age: 87
End: 2022-10-01

## 2023-10-15 ENCOUNTER — HEALTH MAINTENANCE LETTER (OUTPATIENT)
Age: 88
End: 2023-10-15

## (undated) DEVICE — CONNECTOR STOPCOCK 3-WAY HIGH PRESSURE (NAMIC) H965700550091

## (undated) DEVICE — Device

## (undated) DEVICE — WIRE GUIDE 0.035"X260CM AMPTLAZ XSTIFF CVD THSCF-35-260-3-A

## (undated) DEVICE — DISPOSABLE ADAPTER

## (undated) DEVICE — CATH BALLOON TRANSFEMORAL 23MM 9350BC23A

## (undated) DEVICE — CATH DIAG 4FR ANG PIG 538453S

## (undated) DEVICE — SET INTRODUCER SHEATH 14FR 914ES

## (undated) DEVICE — SHTH INTRO 0.021IN ID 6FR DIA

## (undated) DEVICE — CATH ANGIO SUPERTORQUE AL1 6FRX100CM 532-645

## (undated) DEVICE — INTRO TERUMO 5FRX10CM RSS503

## (undated) DEVICE — INFLATION DEVICE ATRION QL2530

## (undated) DEVICE — GUIDEWIRE VASC SAFARI2 0.035X275CM H74939406XS1

## (undated) DEVICE — GUIDEWIRE VASCULAR 0.035IN DIA 145CML 15CML/6CML STAINLESS

## (undated) DEVICE — GLOVE PROTEXIS MICRO 7.5  2D73PM75

## (undated) DEVICE — DRAPE CV SPLIT II 147X106" 9158

## (undated) DEVICE — PACK HEART LEFT CUSTOM

## (undated) RX ORDER — CEFAZOLIN SODIUM 2 G/100ML
INJECTION, SOLUTION INTRAVENOUS
Status: DISPENSED
Start: 2021-04-20

## (undated) RX ORDER — FENTANYL CITRATE 50 UG/ML
INJECTION, SOLUTION INTRAMUSCULAR; INTRAVENOUS
Status: DISPENSED
Start: 2021-04-20

## (undated) RX ORDER — SODIUM CHLORIDE 9 MG/ML
INJECTION, SOLUTION INTRAVENOUS
Status: DISPENSED
Start: 2021-04-20

## (undated) RX ORDER — ASPIRIN 325 MG
TABLET ORAL
Status: DISPENSED
Start: 2021-04-20

## (undated) RX ORDER — PROTAMINE SULFATE 10 MG/ML
INJECTION, SOLUTION INTRAVENOUS
Status: DISPENSED
Start: 2021-04-20

## (undated) RX ORDER — LIDOCAINE HYDROCHLORIDE 10 MG/ML
INJECTION, SOLUTION EPIDURAL; INFILTRATION; INTRACAUDAL; PERINEURAL
Status: DISPENSED
Start: 2021-04-20